# Patient Record
Sex: FEMALE | Race: WHITE | NOT HISPANIC OR LATINO | Employment: OTHER | RURAL
[De-identification: names, ages, dates, MRNs, and addresses within clinical notes are randomized per-mention and may not be internally consistent; named-entity substitution may affect disease eponyms.]

---

## 2017-05-23 ENCOUNTER — CONVERSION ENCOUNTER (OUTPATIENT)
Dept: FAMILY MEDICINE CLINIC | Facility: CLINIC | Age: 82
End: 2017-05-23

## 2017-05-23 LAB
ALBUMIN SERPL-MCNC: 4.2 G/DL (ref 3.6–5.1)
ALP SERPL-CCNC: 55 U/L (ref 33–130)
AST SERPL-CCNC: 21 U/L (ref 10–35)
BILIRUB SERPL-MCNC: 0.7 MG/DL (ref 0.2–1.2)
BUN SERPL-MCNC: 8 MG/DL (ref 7–25)
BUN/CREAT SERPL: 13.3 (CALC) (ref 6–22)
CALCIUM SERPL-MCNC: 9.7 MG/DL (ref 8.6–10.2)
CHLORIDE SERPL-SCNC: 104 MMOL/L (ref 98–110)
CHOLEST SERPL-MCNC: 241 MG/DL (ref 125–200)
CONV CO2: 25 MMOL/L (ref 21–33)
CONV TOTAL PROTEIN: 6 G/DL (ref 6.2–8.3)
CREAT UR-MCNC: 0.6 MG/DL (ref 0.63–1.22)
GLOBULIN UR ELPH-MCNC: 1.8 MG/DL (ref 2.2–3.9)
GLUCOSE UR QL: 117 MG/DL (ref 65–99)
HDLC SERPL-MCNC: 62 MG/DL
INSULIN SERPL-ACNC: 2.3 (CALC) (ref 1–2.1)
LDLC SERPL CALC-MCNC: 122 MG/DL
POTASSIUM SERPL-SCNC: 4.1 MMOL/L (ref 3.5–5.3)
SODIUM SERPL-SCNC: 139 MMOL/L (ref 135–146)
TRIGL SERPL-MCNC: 287 MG/DL

## 2017-06-09 ENCOUNTER — HOSPITAL ENCOUNTER (OUTPATIENT)
Dept: OTHER | Facility: HOSPITAL | Age: 82
Discharge: HOME | End: 2017-06-09
Attending: UROLOGY | Admitting: UROLOGY

## 2017-08-07 ENCOUNTER — HOSPITAL ENCOUNTER (OUTPATIENT)
Dept: OTHER | Facility: HOSPITAL | Age: 82
Discharge: HOME | End: 2017-08-07
Attending: FAMILY MEDICINE | Admitting: FAMILY MEDICINE

## 2018-07-17 ENCOUNTER — EPISODE CHANGES (OUTPATIENT)
Dept: CASE MANAGEMENT | Facility: OTHER | Age: 83
End: 2018-07-17

## 2018-07-23 ENCOUNTER — EPISODE CHANGES (OUTPATIENT)
Dept: CASE MANAGEMENT | Facility: OTHER | Age: 83
End: 2018-07-23

## 2018-07-23 ENCOUNTER — PATIENT OUTREACH (OUTPATIENT)
Dept: CASE MANAGEMENT | Facility: OTHER | Age: 83
End: 2018-07-23

## 2018-07-23 NOTE — OUTREACH NOTE
RN-Care Advisor contacted [facility] at [phone].  Patient was recently discharged from [facility].  Staff indicated patient is Long Term Care status.  RN Care Advisor will continue to monitor for future acute ED/Admissions.

## 2018-09-25 ENCOUNTER — CONVERSION ENCOUNTER (OUTPATIENT)
Dept: FAMILY MEDICINE CLINIC | Facility: CLINIC | Age: 83
End: 2018-09-25

## 2018-11-26 ENCOUNTER — PATIENT OUTREACH (OUTPATIENT)
Dept: CASE MANAGEMENT | Facility: OTHER | Age: 83
End: 2018-11-26

## 2018-11-26 NOTE — OUTREACH NOTE
Documenting additional chart review.  Patient had ED visit 11/24/2018 at [facility].  Notes indicate patient was treated and transported back to LTC facility.  No outreach planned at this time.

## 2019-01-21 ENCOUNTER — CONVERSION ENCOUNTER (OUTPATIENT)
Dept: FAMILY MEDICINE CLINIC | Facility: CLINIC | Age: 84
End: 2019-01-21

## 2019-03-12 ENCOUNTER — CONVERSION ENCOUNTER (OUTPATIENT)
Dept: FAMILY MEDICINE CLINIC | Facility: CLINIC | Age: 84
End: 2019-03-12

## 2019-06-12 VITALS
BODY MASS INDEX: 24.18 KG/M2 | SYSTOLIC BLOOD PRESSURE: 130 MMHG | HEART RATE: 86 BPM | RESPIRATION RATE: 17 BRPM | RESPIRATION RATE: 20 BRPM | SYSTOLIC BLOOD PRESSURE: 130 MMHG | WEIGHT: 128.06 LBS | HEART RATE: 75 BPM | BODY MASS INDEX: 25.92 KG/M2 | HEART RATE: 81 BPM | BODY MASS INDEX: 25.37 KG/M2 | RESPIRATION RATE: 18 BRPM | OXYGEN SATURATION: 98 % | OXYGEN SATURATION: 96 % | HEIGHT: 61 IN | DIASTOLIC BLOOD PRESSURE: 78 MMHG | HEIGHT: 61 IN | WEIGHT: 137.3 LBS | WEIGHT: 134.4 LBS | DIASTOLIC BLOOD PRESSURE: 70 MMHG | HEIGHT: 61 IN | DIASTOLIC BLOOD PRESSURE: 80 MMHG | OXYGEN SATURATION: 96 % | SYSTOLIC BLOOD PRESSURE: 133 MMHG

## 2019-07-10 ENCOUNTER — DOCUMENTATION (OUTPATIENT)
Dept: FAMILY MEDICINE CLINIC | Facility: CLINIC | Age: 84
End: 2019-07-10

## 2019-07-11 DIAGNOSIS — R11.2 NON-INTRACTABLE VOMITING WITH NAUSEA, UNSPECIFIED VOMITING TYPE: Primary | ICD-10-CM

## 2019-07-11 RX ORDER — PROMETHAZINE HYDROCHLORIDE 25 MG/1
25 TABLET ORAL EVERY 6 HOURS PRN
Qty: 20 TABLET | Refills: 0 | Status: SHIPPED | OUTPATIENT
Start: 2019-07-11 | End: 2019-07-16

## 2019-07-15 DIAGNOSIS — H92.03 OTALGIA OF BOTH EARS: Primary | ICD-10-CM

## 2019-07-16 ENCOUNTER — TELEPHONE (OUTPATIENT)
Dept: FAMILY MEDICINE CLINIC | Facility: CLINIC | Age: 84
End: 2019-07-16

## 2019-07-17 ENCOUNTER — TELEPHONE (OUTPATIENT)
Dept: FAMILY MEDICINE CLINIC | Facility: CLINIC | Age: 84
End: 2019-07-17

## 2019-08-09 DIAGNOSIS — G89.29 OTHER CHRONIC PAIN: Primary | ICD-10-CM

## 2019-08-09 RX ORDER — TRAMADOL HYDROCHLORIDE 50 MG/1
50 TABLET ORAL NIGHTLY
Qty: 60 TABLET | Refills: 2 | Status: SHIPPED | OUTPATIENT
Start: 2019-08-09

## 2019-08-30 ENCOUNTER — TELEPHONE (OUTPATIENT)
Dept: FAMILY MEDICINE CLINIC | Facility: CLINIC | Age: 84
End: 2019-08-30

## 2019-08-30 ENCOUNTER — LAB REQUISITION (OUTPATIENT)
Dept: LAB | Facility: HOSPITAL | Age: 84
End: 2019-08-30

## 2019-08-30 DIAGNOSIS — Z00.00 ROUTINE GENERAL MEDICAL EXAMINATION AT A HEALTH CARE FACILITY: ICD-10-CM

## 2019-08-30 LAB
BASOPHILS # BLD AUTO: 0.03 10*3/MM3 (ref 0–0.2)
BASOPHILS NFR BLD AUTO: 0.2 % (ref 0–1.5)
DEPRECATED RDW RBC AUTO: 45.3 FL (ref 37–54)
EOSINOPHIL # BLD AUTO: 0.06 10*3/MM3 (ref 0–0.4)
EOSINOPHIL NFR BLD AUTO: 0.5 % (ref 0.3–6.2)
ERYTHROCYTE [DISTWIDTH] IN BLOOD BY AUTOMATED COUNT: 14.4 % (ref 12.3–15.4)
HCT VFR BLD AUTO: 40.1 % (ref 34–46.6)
HGB BLD-MCNC: 12 G/DL (ref 12–15.9)
IMM GRANULOCYTES # BLD AUTO: 0.1 10*3/MM3 (ref 0–0.05)
IMM GRANULOCYTES NFR BLD AUTO: 0.8 % (ref 0–0.5)
LYMPHOCYTES # BLD AUTO: 1.73 10*3/MM3 (ref 0.7–3.1)
LYMPHOCYTES NFR BLD AUTO: 14.2 % (ref 19.6–45.3)
MCH RBC QN AUTO: 25.5 PG (ref 26.6–33)
MCHC RBC AUTO-ENTMCNC: 29.9 G/DL (ref 31.5–35.7)
MCV RBC AUTO: 85.3 FL (ref 79–97)
MONOCYTES # BLD AUTO: 1.12 10*3/MM3 (ref 0.1–0.9)
MONOCYTES NFR BLD AUTO: 9.2 % (ref 5–12)
NEUTROPHILS # BLD AUTO: 9.17 10*3/MM3 (ref 1.7–7)
NEUTROPHILS NFR BLD AUTO: 75.1 % (ref 42.7–76)
NRBC BLD AUTO-RTO: 0 /100 WBC (ref 0–0.2)
PLATELET # BLD AUTO: 246 10*3/MM3 (ref 140–450)
PMV BLD AUTO: 12.4 FL (ref 6–12)
RBC # BLD AUTO: 4.7 10*6/MM3 (ref 3.77–5.28)
WBC NRBC COR # BLD: 12.21 10*3/MM3 (ref 3.4–10.8)

## 2019-08-30 PROCEDURE — 85025 COMPLETE CBC W/AUTO DIFF WBC: CPT

## 2019-08-30 NOTE — TELEPHONE ENCOUNTER
I called and spoke to Zoila about these orders. Advised her to call me with results since it will be a Holiday weekend and we dont return until Wednesday next week.

## 2019-10-14 ENCOUNTER — CLINICAL SUPPORT NO REQUIREMENTS (OUTPATIENT)
Dept: CARDIOLOGY | Facility: CLINIC | Age: 84
End: 2019-10-14

## 2019-10-14 ENCOUNTER — OFFICE VISIT (OUTPATIENT)
Dept: CARDIOLOGY | Facility: CLINIC | Age: 84
End: 2019-10-14

## 2019-10-14 VITALS
HEART RATE: 109 BPM | SYSTOLIC BLOOD PRESSURE: 109 MMHG | WEIGHT: 130 LBS | OXYGEN SATURATION: 94 % | BODY MASS INDEX: 22.2 KG/M2 | DIASTOLIC BLOOD PRESSURE: 73 MMHG | HEIGHT: 64 IN

## 2019-10-14 DIAGNOSIS — Z95.0 PACEMAKER: Primary | ICD-10-CM

## 2019-10-14 DIAGNOSIS — I44.2 AV BLOCK, COMPLETE (HCC): ICD-10-CM

## 2019-10-14 DIAGNOSIS — E78.2 MIXED HYPERLIPIDEMIA: ICD-10-CM

## 2019-10-14 DIAGNOSIS — I10 BENIGN ESSENTIAL HTN: ICD-10-CM

## 2019-10-14 DIAGNOSIS — I48.0 PAROXYSMAL ATRIAL FIBRILLATION (HCC): ICD-10-CM

## 2019-10-14 PROBLEM — J06.9 ACUTE UPPER RESPIRATORY INFECTION: Status: ACTIVE | Noted: 2019-10-14

## 2019-10-14 PROBLEM — C83.50: Status: ACTIVE | Noted: 2019-10-14

## 2019-10-14 PROBLEM — Z13.220 SCREENING FOR HYPERCHOLESTEROLEMIA: Status: ACTIVE | Noted: 2019-10-14

## 2019-10-14 PROBLEM — R11.0 NAUSEA: Status: ACTIVE | Noted: 2019-10-14

## 2019-10-14 PROBLEM — E11.69 DIABETES MELLITUS TYPE 2 IN OBESE (HCC): Status: ACTIVE | Noted: 2019-10-14

## 2019-10-14 PROBLEM — R53.83 FATIGUE: Status: ACTIVE | Noted: 2019-10-14

## 2019-10-14 PROBLEM — F32.A DEPRESSION: Status: ACTIVE | Noted: 2019-10-14

## 2019-10-14 PROBLEM — Z12.79: Status: ACTIVE | Noted: 2019-10-14

## 2019-10-14 PROBLEM — R19.7 DIARRHEA: Status: ACTIVE | Noted: 2019-10-14

## 2019-10-14 PROBLEM — J44.9 CHRONIC OBSTRUCTIVE PULMONARY DISEASE (HCC): Status: ACTIVE | Noted: 2019-10-14

## 2019-10-14 PROBLEM — R91.8 LUNG FIELD ABNORMAL: Status: ACTIVE | Noted: 2019-10-14

## 2019-10-14 PROBLEM — J01.00 ACUTE NON-RECURRENT MAXILLARY SINUSITIS: Status: ACTIVE | Noted: 2019-10-14

## 2019-10-14 PROBLEM — G25.0 BENIGN ESSENTIAL TREMOR: Status: ACTIVE | Noted: 2019-10-14

## 2019-10-14 PROBLEM — M15.9 GENERALIZED OSTEOARTHRITIS: Status: ACTIVE | Noted: 2019-10-14

## 2019-10-14 PROBLEM — K80.20 CHOLELITHIASIS: Status: ACTIVE | Noted: 2019-10-14

## 2019-10-14 PROBLEM — F41.9 ANXIETY DISORDER: Status: ACTIVE | Noted: 2019-10-14

## 2019-10-14 PROBLEM — N30.80 BACTERIAL CYSTITIS: Status: ACTIVE | Noted: 2019-10-14

## 2019-10-14 PROBLEM — K64.4 EXTERNAL HEMORRHOIDS: Status: ACTIVE | Noted: 2019-10-14

## 2019-10-14 PROBLEM — Z66 DNR (DO NOT RESUSCITATE): Status: ACTIVE | Noted: 2019-10-14

## 2019-10-14 PROBLEM — E78.5 HYPERLIPIDEMIA: Status: ACTIVE | Noted: 2019-10-14

## 2019-10-14 PROBLEM — K59.09 CONSTIPATION, CHRONIC: Status: ACTIVE | Noted: 2019-10-14

## 2019-10-14 PROBLEM — N64.4 BREAST PAIN IN FEMALE: Status: ACTIVE | Noted: 2019-10-14

## 2019-10-14 PROBLEM — H69.80 DYSFUNCTION OF EUSTACHIAN TUBE: Status: ACTIVE | Noted: 2019-10-14

## 2019-10-14 PROBLEM — H69.90 DYSFUNCTION OF EUSTACHIAN TUBE: Status: ACTIVE | Noted: 2019-10-14

## 2019-10-14 PROBLEM — R31.9 HEMATURIA: Status: ACTIVE | Noted: 2019-10-14

## 2019-10-14 PROBLEM — Z79.84 LONG TERM (CURRENT) USE OF ORAL HYPOGLYCEMIC DRUGS: Status: ACTIVE | Noted: 2019-10-14

## 2019-10-14 PROBLEM — E66.9 DIABETES MELLITUS TYPE 2 IN OBESE (HCC): Status: ACTIVE | Noted: 2019-10-14

## 2019-10-14 PROBLEM — Z78.9 IMPAIRED ACTIVITIES OF DAILY LIVING: Status: ACTIVE | Noted: 2019-10-14

## 2019-10-14 PROBLEM — G99.2: Status: ACTIVE | Noted: 2019-10-14

## 2019-10-14 PROBLEM — H91.90 HEARING DIFFICULTY: Status: ACTIVE | Noted: 2019-10-14

## 2019-10-14 PROBLEM — R10.84 GENERALIZED ABDOMINAL DISCOMFORT: Status: ACTIVE | Noted: 2019-10-14

## 2019-10-14 PROBLEM — D50.9 IRON DEFICIENCY ANEMIA: Status: ACTIVE | Noted: 2019-10-14

## 2019-10-14 PROBLEM — M25.569 KNEE PAIN: Status: ACTIVE | Noted: 2019-10-14

## 2019-10-14 PROBLEM — R25.2 CRAMP OF BOTH LOWER EXTREMITIES: Status: ACTIVE | Noted: 2019-10-14

## 2019-10-14 PROBLEM — R53.1 WEAKNESS: Status: ACTIVE | Noted: 2019-10-14

## 2019-10-14 PROBLEM — R35.0 INCREASED FREQUENCY OF URINATION: Status: ACTIVE | Noted: 2019-10-14

## 2019-10-14 PROBLEM — J20.9 ACUTE BRONCHITIS: Status: ACTIVE | Noted: 2019-10-14

## 2019-10-14 PROBLEM — K92.1 BLOODY STOOL: Status: ACTIVE | Noted: 2019-10-14

## 2019-10-14 PROBLEM — R26.9 ABNORMAL GAIT: Status: ACTIVE | Noted: 2019-10-14

## 2019-10-14 PROBLEM — R05.9 COUGH: Status: ACTIVE | Noted: 2019-10-14

## 2019-10-14 PROBLEM — Z23 NEED FOR IMMUNIZATION AGAINST INFLUENZA: Status: ACTIVE | Noted: 2019-10-14

## 2019-10-14 PROBLEM — H65.03 BILATERAL ACUTE SEROUS OTITIS MEDIA: Status: ACTIVE | Noted: 2019-10-14

## 2019-10-14 PROBLEM — E13.40: Status: ACTIVE | Noted: 2019-10-14

## 2019-10-14 PROBLEM — Z87.891 HISTORY OF TOBACCO USE: Status: ACTIVE | Noted: 2019-10-14

## 2019-10-14 PROBLEM — R60.0 EDEMA OF LEFT LOWER EXTREMITY: Status: ACTIVE | Noted: 2019-10-14

## 2019-10-14 PROBLEM — Z82.3 FAMILY HISTORY OF CEREBROVASCULAR ACCIDENT (CVA): Status: ACTIVE | Noted: 2019-10-14

## 2019-10-14 PROBLEM — H81.13 BENIGN POSITIONAL VERTIGO, BILATERAL: Status: ACTIVE | Noted: 2019-10-14

## 2019-10-14 PROBLEM — Z83.3 FAMILY HISTORY OF DIABETES MELLITUS: Status: ACTIVE | Noted: 2019-10-14

## 2019-10-14 PROBLEM — Z86.711 HISTORY OF PULMONARY EMBOLISM: Status: ACTIVE | Noted: 2019-10-14

## 2019-10-14 PROBLEM — Z91.81 AT HIGH RISK FOR FALLS: Status: ACTIVE | Noted: 2019-10-14

## 2019-10-14 PROBLEM — Z13.820 SCREENING FOR OSTEOPOROSIS: Status: ACTIVE | Noted: 2019-10-14

## 2019-10-14 PROBLEM — M94.0 COSTALCHONDRITIS: Status: ACTIVE | Noted: 2019-10-14

## 2019-10-14 PROBLEM — Z12.31 ENCOUNTER FOR SCREENING MAMMOGRAM FOR MALIGNANT NEOPLASM OF BREAST: Status: ACTIVE | Noted: 2019-10-14

## 2019-10-14 PROBLEM — K21.9 GASTROESOPHAGEAL REFLUX DISEASE: Status: ACTIVE | Noted: 2019-10-14

## 2019-10-14 PROBLEM — E03.9 HYPOTHYROIDISM: Status: ACTIVE | Noted: 2019-10-14

## 2019-10-14 PROBLEM — R14.0 ABDOMINAL DISTENTION: Status: ACTIVE | Noted: 2019-10-14

## 2019-10-14 PROBLEM — E78.1 HYPERTRIGLYCERIDEMIA, ESSENTIAL: Status: ACTIVE | Noted: 2019-10-14

## 2019-10-14 PROBLEM — H35.9 MACULAR DISORDER: Status: ACTIVE | Noted: 2019-10-14

## 2019-10-14 PROBLEM — R42 DIZZINESS: Status: ACTIVE | Noted: 2019-10-14

## 2019-10-14 PROBLEM — N20.0 CALCULUS OF KIDNEY: Status: ACTIVE | Noted: 2019-10-14

## 2019-10-14 PROBLEM — H40.9 GLAUCOMA: Status: ACTIVE | Noted: 2019-10-14

## 2019-10-14 PROBLEM — L02.229 BOIL OF TRUNK: Status: ACTIVE | Noted: 2019-10-14

## 2019-10-14 PROCEDURE — 99214 OFFICE O/P EST MOD 30 MIN: CPT | Performed by: INTERNAL MEDICINE

## 2019-10-14 PROCEDURE — 93000 ELECTROCARDIOGRAM COMPLETE: CPT | Performed by: INTERNAL MEDICINE

## 2019-10-14 PROCEDURE — 93280 PM DEVICE PROGR EVAL DUAL: CPT | Performed by: INTERNAL MEDICINE

## 2019-10-14 NOTE — PROGRESS NOTES
Encounter Date:10/14/2019  Last seen 3/11/2019      Patient ID: Ashley Elkins is a 86 y.o. female.    Chief Complaint:  Follow-up  Pacemaker  History of atrial fibrillation  Dyslipidemia  Diabetes    History of Present Illness    Since I have last seen, the patient has been without any chest discomfort ,shortness of breath, palpitations, dizziness or syncope.  Denies having any headache ,abdominal pain ,nausea, vomiting , diarrhea constipation, loss of weight or loss of appetite.  Denies having any excessive bruising ,hematuria or blood in the stool.    Review of all systems negative except as indicated  Assessment and Plan       ////////////////////////////    Impression  ==================  -status post permanent dual-chamber pacemaker implantation (Medtronic MRI compatible) and removal of loop recorder 10/14/2016  - history of dizziness and near-syncope.  improved since pacemaker implantation     - history of atrial fibrillation -maintaining sinus rhythm.    -chest pain-improved  Negative Lexiscan Cardiolite test July 2016.      - diabetes dyslipidemia parkinsonism and non-Hodgkin's lymphoma    - history of normal coronary arteries and hypercontractile left ventricle 2012.    - status post cholecystectomy appendectomy and hysterectomy    - allergy to penicillin and sulfa     - DNR  =====  =======  EKG showed atrial sensed ventricular paced rhythm  Patient is not having any angina pectoris or congestive heart failure or recurrence of syncope.  Medications were reviewed and updated.  Interrogation of the pacemaker revealed excellent pacing parameters.  Followup in the office in 6 months With pacemaker interrogation.  //////////I           Diagnosis Plan   1. Pacemaker  ECG 12 Lead   2. Paroxysmal atrial fibrillation (CMS/HCC)  ECG 12 Lead   3. AV block, complete (CMS/HCC)  ECG 12 Lead   4. Benign essential HTN  ECG 12 Lead   5. Mixed hyperlipidemia  ECG 12 Lead   LAB RESULTS (LAST 7 DAYS)    CBC        BMP         CMP         BNP        TROPONIN        CoAg        Creatinine Clearance  CrCl cannot be calculated (Patient's most recent lab result is older than the maximum 30 days allowed.).    ABG        Radiology  No radiology results for the last day                The following portions of the patient's history were reviewed and updated as appropriate: allergies, current medications, past family history, past medical history, past social history, past surgical history and problem list.    Review of Systems   Constitution: Negative for malaise/fatigue.   Cardiovascular: Negative for chest pain, leg swelling, palpitations and syncope.   Respiratory: Negative for shortness of breath.    Skin: Negative for rash.   Gastrointestinal: Negative for nausea and vomiting.   Neurological: Negative for dizziness, light-headedness and numbness.         Current Outpatient Medications:   •  acetaminophen (ACETAMINOPHEN 8 HOUR) 650 MG 8 hr tablet, Take  by mouth Every 8 (Eight) Hours., Disp: , Rfl:   •  BIOTIN PO, BIOTIN, Disp: , Rfl:   •  docusate sodium (COLACE) 100 MG capsule, Take 1 capsule by mouth., Disp: , Rfl:   •  gabapentin (NEURONTIN) 300 MG capsule, GABAPENTIN 300 MG CAPS, Disp: , Rfl:   •  magnesium oxide 250 MG tablet, Take  by mouth Daily., Disp: , Rfl:   •  metFORMIN (GLUCOPHAGE) 1000 MG tablet, Take  by mouth., Disp: , Rfl:   •  montelukast (SINGULAIR) 10 MG tablet, Daily., Disp: , Rfl:   •  Multiple Vitamins-Minerals (OCUVITE PO), Take  by mouth., Disp: , Rfl:   •  pantoprazole (PROTONIX) 40 MG EC tablet, Daily., Disp: , Rfl:   •  Probiotic Product (PROBIOTIC DAILY PO), Daily., Disp: , Rfl:   •  promethazine (PHENERGAN) 25 MG tablet, Daily., Disp: , Rfl:   •  sulindac (CLINORIL) 150 MG tablet, Take  by mouth., Disp: , Rfl:   •  timolol (TIMOPTIC-XR) 0.5 % ophthalmic gel-forming, Apply  to eye(s) as directed by provider Daily., Disp: , Rfl:   •  traMADol (ULTRAM) 50 MG tablet, Take 1 tablet by mouth Every Night., Disp:  60 tablet, Rfl: 2  •  vitamin B-12 (CYANOCOBALAMIN) 1000 MCG tablet, VITAMIN B-12 1000 MCG TABS, Disp: , Rfl:     Allergies   Allergen Reactions   • Penicillins Unknown (See Comments)   • Sulfa Antibiotics Unknown (See Comments)       Family History   Problem Relation Age of Onset   • Heart disease Other    • Stroke Mother    • Diabetes Mother        Past Surgical History:   Procedure Laterality Date   • CARDIAC PACEMAKER PLACEMENT  10/14/2016   • TOTAL ABDOMINAL HYSTERECTOMY  1978    (Ovaries remain)       Past Medical History:   Diagnosis Date   • Abdominal discomfort, generalized     Impression: May be the metformin. Have her stop this. Will check labs   • Abdominal distension     Impression: This may be due to a GI infection. She had a KUB ordered and read by me. The KUB was normal.   • Abnormal gait     Impression: Deconditioning, needs physical therapy   • Acute cystitis with hematuria     Impression: resolved   • Acute non-recurrent maxillary sinusitis     Impression: She was prescribed Cipro to treat her symptoms. She was given Depo/Dexa IM inj. Increase fluids. Tylenol/motrin for pain or fever. Medication and medication adverse effects discussed. Follow-up 5-7 days for reevaluation if not improved or sooner if needed. The insulin will make her bs increase and she will need to monitor her sugar.   • Anxiety     Impression: stable. No medications at this time.   • At high risk for falls     Impression: due to Parkinson's related weakness.   • Bacterial cystitis     Impression: ESBL with only 50,000 colonies and mild sx, not tested for Fosfomycin by due to the mild infection I feel it is better than more high risk drugs   • Benign essential HTN     Impression: Improved from her previous visit. stable on recheck. Her blood pressure can not support and ace or arb   • Benign essential tremor     Impression: stable, only worsens with eating and drinking.   • Benign positional vertigo, bilateral     Impression:  Based on her symptoms she likely has BPV. She was given a handout on the Epley manuever. She was encouraged to RTC if her symptoms did not improve.   • Bilateral acute serous otitis media     Impression: Improving. Advised patient to continue with claritin and add Flonase. If not improving, return to be re-evaluated.   • Bloody stool     Impression: CBC was ordered. Hemoccult was ordered. Monitor her symptoms.   • Boil of trunk     Impression: no fluctuance, discussed home care, return in a few days if it worsens   • Breast pain in female     Impression: will get diagnostic mammograms   • Bronchitis, acute     Impression: Increase fluids. Tylenol/motrin for pain or fever. Medication and medication adverse effects discussed. Follow-up 5-7 days for reevaluation if not improved or sooner if needed.   • Cholelithiasis     Impression: s/p gall bladder   • Colon cancer screening    • Constipation, chronic     Impression: start Linzess 72mcg daily. Continue Colace. Hold Miralx and Milk of mag for diarrhea.   • Costochondritis     Impression: ice, nsaids, Diagnosis, treatment and course discussed. Discussed medication, dosing and adverse effects. Return if there is worsening or persistance of symptoms Cause of chest pain   • Cough    • Cramp of both lower extremities     Impression: labs ordered   • Depression     Impression: Stable on no medications. Monitor symptoms.   • Diabetes mellitus type 2 in obese (CMS/HCC)     Impression: Worsening She was advised to only take Metformin 1 tab BID. Glyburide was increased to BID. Continue to monitor sugars Goals developed at last visit were met Follow up in 4 months Care management needs are self addressed. Self-Management abilities addressed and patient is capable of managing his/her own disease   • Diabetic myelopathy due to secondary diabetes mellitus (CMS/HCC)    • Diarrhea     Impression: This is likely a GI infection and will resolve on its won. Consider a stool sample if  does not improve.   • Diffuse lymphoblastic non-Hodgkin's lymphoma (CMS/HCC)     Impression: Stable. She has seen oncology   • Dizziness     Impression: resolved, much better   • DNR (do not resuscitate)     Recorded 04/24/2019 01:09 AM by Kenton Owens MD, [facility].   • Dysuria     Impression: send urine for culture prescription as below. She is going to see urology   • Edema of left lower extremity     Impression: us ordered- returned negative for DVT   • Encounter for screening for malignant neoplasm of other genitourinary organs     Impression: She declines   • Encounter for screening mammogram for malignant neoplasm of breast    • Eustachian tube dysfunction    • External hemorrhoid     Impression: from diarrhea, a little sore she says.   • Fatigue     Impression: check labs as above. possible UTI. Will get culture   • Gastroesophageal reflux disease     Impression: She was seen at [facility] and stayed for cardiac work up, found to be gerd.   • Generalized osteoarthritis     Impression: left knee   • Glaucoma     Impression: worsening per patient and she is seeing opthomologist   • Hearing difficulty    • Hematuria     Impression: urine culture sent, anitbiotc sent   • History of pulmonary embolism    • History of tobacco use    • Hypertriglyceridemia, essential    • Hypothyroidism, unspecified     Impression: Stable. Refills were given.   • Iatrogenic pulmonary embolism and infarction (CMS/HCC)     Impression: mulitple PE in both lungs, 6/2011. Currenlty on no Coumodin.   • Incisional pain     Impression: rash actually, irritated only   • Iron deficiency anemia     Impression: per low MCV on cbc. Check iron levels to see how bad it is. Start iron supplement.   • Kidney stone     Impression: found in ER, she has appt with urologist, Dr Everett   • Knee pain     Impression: right knee strain, monitor. nsaids and tylenol. recheck 2 weeks if not better   • Long term (current) use of oral hypoglycemic drugs    •  Macular disorder      (Probable Diagnosis) Story: Seeing retinal specialist at [facility] 10/16/14   • Medicare annual wellness visit, subsequent     Story: She is on hospice care for parkinson's.  Impression: Discussed injury prevention, diet and exercise, safe sexual practices, and screening for common diseases. Encouraged use of sunscreen and seatbelts. Avoidance of tobacco encouraged. Limitation or avoidance of alcohol encouraged. Recommend yearly dental and eye exams. Also discussed monitoring of blood pressure, lipids.   • Mixed hyperlipidemia     Impression: Discussed the need for her to remain on her statin. It was mutually decided that her Statin would be stopped on 07/06/17.   • Nausea     Impression: She was prescribed Zofran to treat her symptoms.   • Neuropathy of both feet     Impression: Secondary to diabetes and also parkinson's. Monofilament exam failed. Diabetic shoes recommended for this patient. Order faxed.   • Other chest pain     Impression: Work up negative in hospital. It was chest wall pain. She has a prescription for norco that Dr Moncada gave her.   • Overweight    • Parkinson disease (CMS/[facility])     Impression: Slowly worsening. Discussed disease progression. Discussed she will continue to get worse. Patient will need to be scheduled for a face to face. This will be done within the next 7 days.   • Physical deconditioning     Impression: from parkinsons and age. WIll set up home health   • Radiologic findings of lung field, abnormal    • Restless leg     Impression: Stable.   • Screening for hypercholesterolemia    • Screening for osteoporosis    • Senile osteoporosis     Impression: Diagnosis, treatment and course discussed. Discussed medication, dosing and adverse effects. Return if there is worsening or persistance of symptoms   • Shortness of breath     Impression: continue oxygen therapy.   • Skin tag    • Sternoclavicular joint pain, left     Impression: New diagnosis-Advised  patient to do ROM exercsies, alternate heat/ice on neck and gentle massage. discussed more aggressive workup and treatment but pt declined   • Syncope     unspecified syncope type. Impression: possibly heart. She is seeing Dr Moncada   • Tachycardia     Impression: check holter, send to cariologist- Dr Howell   • Tremor     Impression: familial vs parkinson's. Neurologist is leaning to familial type. He is adjusting her meds. She is seeing Dr Butts   • Type II diabetes mellitus, uncontrolled (CMS/HCC)     Impression: 140   • Upper back pain     Impression: muscular, discussed ice and otc tylenol. return if owrsens or not better in 2 weeks or sooner if needed   • URI (upper respiratory infection)     Impression: Increase fluids. Tylenol/motrin for pain or fever. Medication and medication adverse effects discussed. Follow-up 5-7 days for reevaluation if not improved or sooner if needed.   • Urinary frequency     Impression: could not urinate. Increase in urination at night, secodnary to new meds, Diagnosis, treatment and course discussed. Discussed medication, dosing and adverse effects. Return if there is worsening or persistance of symptoms Return for u/a next week. Let us know if change in meds, changes symptoms.   • Urinary retention     Impression: mild. see if it resolves with resolution of constipation   • UTI due to extended-spectrum beta lactamase (ESBL) producing Escherichia coli     Impression: pt has minimal sx with no evidence of pyloneph and only 50,000 colonies thus will treat with fosfomycin   • Viral gastroenteritis     Impression: feeling much better.   • Weakness     Story: secondary to Parkinson's. Impression: Went to the ER. Work up was negative in the ER. Discussed that since she declines medication for parkinson's that her disease will progress to issues with walking, talking, eating, all ADL's. She states understanding and her daughter does as well.       Family History   Problem Relation Age  "of Onset   • Heart disease Other    • Stroke Mother    • Diabetes Mother        Social History     Socioeconomic History   • Marital status:      Spouse name: Not on file   • Number of children: Not on file   • Years of education: Not on file   • Highest education level: Not on file   Tobacco Use   • Smoking status: Former Smoker   Substance and Sexual Activity   • Alcohol use: No     Frequency: Never           ECG 12 Lead  Date/Time: 10/14/2019 3:23 PM  Performed by: Nancy Howell MD  Authorized by: Nancy Howell MD   Comparison: compared with previous ECG   Comments: Atrial sensed ventricular paced rhythm 106/min no ectopy nonspecific ST-T wave changes no change from 3/11/2019              Objective:       Physical Exam    /73 (BP Location: Left arm, Patient Position: Sitting, Cuff Size: Adult)   Pulse 109   Ht 162.6 cm (64\")   Wt 59 kg (130 lb)   SpO2 94%   BMI 22.31 kg/m²   The patient is alert, oriented and in no distress.    Vital signs as noted above.    Head and neck revealed no carotid bruits or jugular venous distension.  No thyromegaly or lymphadenopathy is present.    Lungs clear.  No wheezing.  Breath sounds are normal bilaterally.    Heart normal first and second heart sounds.  No murmur..  No pericardial rub is present.  No gallop is present.    Abdomen soft and nontender.  No organomegaly is present.    Extremities revealed good peripheral pulses without any pedal edema.    Skin warm and dry.  Pacemaker site looks normal.    Musculoskeletal system is grossly normal.    CNS grossly normal.        "

## 2020-06-08 ENCOUNTER — TELEMEDICINE - AUDIO (OUTPATIENT)
Dept: CARDIOLOGY | Facility: CLINIC | Age: 85
End: 2020-06-08

## 2020-06-08 DIAGNOSIS — E78.2 MIXED HYPERLIPIDEMIA: ICD-10-CM

## 2020-06-08 DIAGNOSIS — I10 BENIGN ESSENTIAL HTN: ICD-10-CM

## 2020-06-08 DIAGNOSIS — I48.0 PAROXYSMAL ATRIAL FIBRILLATION (HCC): ICD-10-CM

## 2020-06-08 DIAGNOSIS — Z95.0 PACEMAKER: Primary | ICD-10-CM

## 2020-06-08 DIAGNOSIS — I44.2 AV BLOCK, COMPLETE (HCC): ICD-10-CM

## 2020-06-08 PROCEDURE — 99442 PR PHYS/QHP TELEPHONE EVALUATION 11-20 MIN: CPT | Performed by: INTERNAL MEDICINE

## 2020-06-08 RX ORDER — POLYETHYLENE GLYCOL 3350 17 G/17G
POWDER, FOR SOLUTION ORAL
COMMUNITY
Start: 2020-06-01

## 2020-06-08 RX ORDER — LATANOPROST 50 UG/ML
SOLUTION/ DROPS OPHTHALMIC
COMMUNITY
Start: 2020-04-24

## 2020-06-08 RX ORDER — GUAIFENESIN 600 MG/1
600 TABLET, EXTENDED RELEASE ORAL 2 TIMES DAILY
COMMUNITY

## 2020-06-08 RX ORDER — LORATADINE 10 MG/1
TABLET ORAL
COMMUNITY
Start: 2020-06-05

## 2020-06-08 RX ORDER — ONDANSETRON 4 MG/1
TABLET, FILM COATED ORAL
COMMUNITY
Start: 2020-05-29 | End: 2022-11-15

## 2020-06-08 RX ORDER — GABAPENTIN 600 MG/1
600 TABLET ORAL DAILY
COMMUNITY
Start: 2020-06-03

## 2020-06-08 RX ORDER — MAGNESIUM HYDROXIDE 1200 MG/15ML
SUSPENSION ORAL
COMMUNITY
Start: 2020-05-29

## 2020-06-08 RX ORDER — LINACLOTIDE 72 UG/1
72 CAPSULE, GELATIN COATED ORAL
COMMUNITY
Start: 2020-05-14 | End: 2022-11-15

## 2020-06-08 RX ORDER — TIMOLOL MALEATE 5 MG/ML
SOLUTION/ DROPS OPHTHALMIC
COMMUNITY
Start: 2020-05-08

## 2020-06-08 RX ORDER — LEVOTHYROXINE SODIUM 0.03 MG/1
25 TABLET ORAL DAILY
COMMUNITY
Start: 2020-05-19

## 2020-06-08 RX ORDER — SULINDAC 200 MG/1
TABLET ORAL
COMMUNITY
Start: 2020-03-12 | End: 2022-11-15

## 2020-06-08 RX ORDER — BISACODYL 10 MG
SUPPOSITORY, RECTAL RECTAL
COMMUNITY
Start: 2020-03-13

## 2020-06-08 NOTE — PROGRESS NOTES
You have chosen to receive care through a telehealth visit.  Do you consent to use a video/audio connection for your medical care today? Yes  Telephone visit- 12 minutes    Last seen 10/14/2019    Encounter Date:06/08/2020  Last seen 10/14/2019      Patient ID: Ashley Elkins is a 87 y.o. female.    Chief Complaint:  Follow-up  Pacemaker  History of atrial fibrillation  Dyslipidemia  Diabetes     History of Present Illness     Since I have last seen, the patient has been without any chest discomfort ,shortness of breath, palpitations, dizziness or syncope.  Denies having any headache ,abdominal pain ,nausea, vomiting , diarrhea constipation, loss of weight or loss of appetite.  Denies having any excessive bruising ,hematuria or blood in the stool.     Review of all systems negative except as indicated  Assessment and Plan         ////////////////////////////    Impression  ==================  -status post permanent dual-chamber pacemaker implantation ([employer]tronic MRI compatible) and removal of loop recorder 10/14/2016  - history of dizziness and near-syncope.  improved since pacemaker implantation      - history of atrial fibrillation -maintaining sinus rhythm.     -chest pain-improved  Negative Lexiscan Cardiolite test July 2016.       - diabetes dyslipidemia parkinsonism and non-Hodgkin's lymphoma     - history of normal coronary arteries and hypercontractile left ventricle 2012.     - status post cholecystectomy appendectomy and hysterectomy     - allergy to penicillin and sulfa      - DNR  =====  =======  Video converted to telephone visit-technical difficulties  Patient is not having any angina pectoris or congestive heart failure or recurrence of syncope.  Medications were reviewed and updated.  Follow-up in the office in 3 months with pacemaker interrogation  Further plan will depend on patient's progress.  //////////I         Diagnosis Plan   1. Pacemaker     2. Paroxysmal atrial fibrillation (CMS/HCC)     3.  AV block, complete (CMS/HCC)     4. Benign essential HTN     5. Mixed hyperlipidemia     LAB RESULTS (LAST 7 DAYS)    CBC        BMP        CMP         BNP        TROPONIN        CoAg        Creatinine Clearance  CrCl cannot be calculated (Patient's most recent lab result is older than the maximum 30 days allowed.).    ABG        Radiology  No radiology results for the last day                The following portions of the patient's history were reviewed and updated as appropriate: allergies, current medications, past family history, past medical history, past social history, past surgical history and problem list.    Review of Systems   Constitution: Negative for fever and malaise/fatigue.   HENT: Negative for congestion and hearing loss.    Eyes: Negative for double vision and visual disturbance.   Cardiovascular: Negative for chest pain, claudication, dyspnea on exertion, leg swelling, palpitations and syncope.   Respiratory: Negative for cough and shortness of breath.    Endocrine: Negative for cold intolerance.   Skin: Negative for color change and rash.   Musculoskeletal: Negative for arthritis and joint pain.   Gastrointestinal: Negative for abdominal pain and heartburn.   Genitourinary: Negative for hematuria.   Neurological: Negative for excessive daytime sleepiness and dizziness.   Psychiatric/Behavioral: Negative for depression. The patient is not nervous/anxious.    All other systems reviewed and are negative.        Current Outpatient Medications:   •  acetaminophen (ACETAMINOPHEN 8 HOUR) 650 MG 8 hr tablet, Take  by mouth Every 8 (Eight) Hours., Disp: , Rfl:   •  ALLERGY RELIEF 10 MG tablet, , Disp: , Rfl:   •  BIOTIN PO, BIOTIN, Disp: , Rfl:   •  docusate sodium (COLACE) 100 MG capsule, Take 1 capsule by mouth., Disp: , Rfl:   •  gabapentin (NEURONTIN) 600 MG tablet, Take 600 mg by mouth Daily., Disp: , Rfl:   •  guaiFENesin (MUCINEX) 600 MG 12 hr tablet, Take 600 mg by mouth 2 (Two) Times a Day.,  Disp: , Rfl:   •  latanoprost (XALATAN) 0.005 % ophthalmic solution, , Disp: , Rfl:   •  levothyroxine (SYNTHROID, LEVOTHROID) 25 MCG tablet, Take 25 mcg by mouth Daily., Disp: , Rfl:   •  LINZESS 72 MCG capsule capsule, 72 mcg Every Morning Before Breakfast., Disp: , Rfl:   •  metFORMIN (GLUCOPHAGE) 1000 MG tablet, Take 1,000 mg by mouth Daily With Breakfast., Disp: , Rfl:   •  MILK OF MAGNESIA 7.75 % suspension, , Disp: , Rfl:   •  montelukast (SINGULAIR) 10 MG tablet, Daily., Disp: , Rfl:   •  Multiple Vitamins-Minerals (OCUVITE PO), Take  by mouth., Disp: , Rfl:   •  ondansetron (ZOFRAN) 4 MG tablet, , Disp: , Rfl:   •  pantoprazole (PROTONIX) 40 MG EC tablet, Daily., Disp: , Rfl:   •  polyethylene glycol (MIRALAX) 17 GM/SCOOP powder, , Disp: , Rfl:   •  sulindac (CLINORIL) 200 MG tablet, , Disp: , Rfl:   •  timolol (TIMOPTIC-XR) 0.5 % ophthalmic gel-forming, Apply  to eye(s) as directed by provider Daily., Disp: , Rfl:   •  traMADol (ULTRAM) 50 MG tablet, Take 1 tablet by mouth Every Night., Disp: 60 tablet, Rfl: 2  •  vitamin B-12 (CYANOCOBALAMIN) 1000 MCG tablet, VITAMIN B-12 1000 MCG TABS, Disp: , Rfl:   •  bisacodyl (DULCOLAX) 10 MG suppository, , Disp: , Rfl:   •  gabapentin (NEURONTIN) 300 MG capsule, GABAPENTIN 300 MG CAPS, Disp: , Rfl:   •  magnesium oxide 250 MG tablet, Take  by mouth Daily., Disp: , Rfl:   •  Probiotic Product (PROBIOTIC DAILY PO), Daily., Disp: , Rfl:   •  promethazine (PHENERGAN) 25 MG tablet, Daily., Disp: , Rfl:   •  raNITIdine HCl (RANITIDINE 150 MAX STRENGTH PO), 1 tablet Daily., Disp: , Rfl:   •  timolol (TIMOPTIC) 0.5 % ophthalmic solution, , Disp: , Rfl:     Allergies   Allergen Reactions   • Penicillins Unknown (See Comments)   • Sulfa Antibiotics Unknown (See Comments)       Family History   Problem Relation Age of Onset   • Heart disease Other    • Stroke Mother    • Diabetes Mother        Past Surgical History:   Procedure Laterality Date   • CARDIAC PACEMAKER PLACEMENT   10/14/2016   • TOTAL ABDOMINAL HYSTERECTOMY  1978    (Ovaries remain)       Past Medical History:   Diagnosis Date   • Abdominal discomfort, generalized     Impression: May be the metformin. Have her stop this. Will check labs   • Abdominal distension     Impression: This may be due to a GI infection. She had a KUB ordered and read by me. The KUB was normal.   • Abnormal gait     Impression: Deconditioning, needs physical therapy   • Acute cystitis with hematuria     Impression: resolved   • Acute non-recurrent maxillary sinusitis     Impression: She was prescribed Cipro to treat her symptoms. She was given Depo/Dexa IM inj. Increase fluids. Tylenol/motrin for pain or fever. Medication and medication adverse effects discussed. Follow-up 5-7 days for reevaluation if not improved or sooner if needed. The insulin will make her bs increase and she will need to monitor her sugar.   • Anxiety     Impression: stable. No medications at this time.   • At high risk for falls     Impression: due to Parkinson's related weakness.   • Bacterial cystitis     Impression: ESBL with only 50,000 colonies and mild sx, not tested for Fosfomycin by due to the mild infection I feel it is better than more high risk drugs   • Benign essential HTN     Impression: Improved from her previous visit. stable on recheck. Her blood pressure can not support and ace or arb   • Benign essential tremor     Impression: stable, only worsens with eating and drinking.   • Benign positional vertigo, bilateral     Impression: Based on her symptoms she likely has BPV. She was given a handout on the Epley manuever. She was encouraged to RTC if her symptoms did not improve.   • Bilateral acute serous otitis media     Impression: Improving. Advised patient to continue with claritin and add Flonase. If not improving, return to be re-evaluated.   • Bloody stool     Impression: CBC was ordered. Hemoccult was ordered. Monitor her symptoms.   • Boil of trunk      Impression: no fluctuance, discussed home care, return in a few days if it worsens   • Breast pain in female     Impression: will get diagnostic mammograms   • Bronchitis, acute     Impression: Increase fluids. Tylenol/motrin for pain or fever. Medication and medication adverse effects discussed. Follow-up 5-7 days for reevaluation if not improved or sooner if needed.   • Cholelithiasis     Impression: s/p gall bladder   • Colon cancer screening    • Constipation, chronic     Impression: start Linzess 72mcg daily. Continue Colace. Hold Miralx and Milk of mag for diarrhea.   • Costochondritis     Impression: ice, nsaids, Diagnosis, treatment and course discussed. Discussed medication, dosing and adverse effects. Return if there is worsening or persistance of symptoms Cause of chest pain   • Cough    • COVID-19    • Cramp of both lower extremities     Impression: labs ordered   • Depression     Impression: Stable on no medications. Monitor symptoms.   • Diabetes mellitus type 2 in obese (CMS/HCC)     Impression: Worsening She was advised to only take Metformin 1 tab BID. Glyburide was increased to BID. Continue to monitor sugars Goals developed at last visit were met Follow up in 4 months Care management needs are self addressed. Self-Management abilities addressed and patient is capable of managing his/her own disease   • Diabetic myelopathy due to secondary diabetes mellitus (CMS/HCC)    • Diarrhea     Impression: This is likely a GI infection and will resolve on its won. Consider a stool sample if does not improve.   • Diffuse lymphoblastic non-Hodgkin's lymphoma (CMS/HCC)     Impression: Stable. She has seen oncology   • Dizziness     Impression: resolved, much better   • DNR (do not resuscitate)     Recorded 04/24/2019 01:09 AM by Kenton Owens MD, [facility].   • Dysuria     Impression: send urine for culture prescription as below. She is going to see urology   • Edema of left lower extremity     Impression: us  ordered- returned negative for DVT   • Encounter for screening for malignant neoplasm of other genitourinary organs     Impression: She declines   • Encounter for screening mammogram for malignant neoplasm of breast    • Eustachian tube dysfunction    • External hemorrhoid     Impression: from diarrhea, a little sore she says.   • Fatigue     Impression: check labs as above. possible UTI. Will get culture   • Gastroesophageal reflux disease     Impression: She was seen at [facility] and stayed for cardiac work up, found to be gerd.   • Generalized osteoarthritis     Impression: left knee   • Glaucoma     Impression: worsening per patient and she is seeing opthomologist   • Hearing difficulty    • Hematuria     Impression: urine culture sent, anitbiotc sent   • History of pulmonary embolism    • History of tobacco use    • Hypertriglyceridemia, essential    • Hypothyroidism, unspecified     Impression: Stable. Refills were given.   • Iatrogenic pulmonary embolism and infarction (CMS/HCC)     Impression: mulitple PE in both lungs, 6/2011. Currenlty on no Coumodin.   • Incisional pain     Impression: rash actually, irritated only   • Iron deficiency anemia     Impression: per low MCV on cbc. Check iron levels to see how bad it is. Start iron supplement.   • Kidney stone     Impression: found in ER, she has appt with urologist, Dr Everett   • Knee pain     Impression: right knee strain, monitor. nsaids and tylenol. recheck 2 weeks if not better   • Long term (current) use of oral hypoglycemic drugs    • Macular disorder      (Probable Diagnosis) Story: Seeing retinal specialist at [facility] 10/16/14   • Medicare annual wellness visit, subsequent     Story: She is on hospice care for parkinson's.  Impression: Discussed injury prevention, diet and exercise, safe sexual practices, and screening for common diseases. Encouraged use of sunscreen and seatbelts. Avoidance of tobacco encouraged. Limitation or avoidance of alcohol  encouraged. Recommend yearly dental and eye exams. Also discussed monitoring of blood pressure, lipids.   • Mixed hyperlipidemia     Impression: Discussed the need for her to remain on her statin. It was mutually decided that her Statin would be stopped on 07/06/17.   • Nausea     Impression: She was prescribed Zofran to treat her symptoms.   • Neuropathy of both feet     Impression: Secondary to diabetes and also parkinson's. Monofilament exam failed. Diabetic shoes recommended for this patient. Order faxed.   • Other chest pain     Impression: Work up negative in hospital. It was chest wall pain. She has a prescription for norco that Dr Moncada gave her.   • Overweight    • Parkinson disease (CMS/[facility])     Impression: Slowly worsening. Discussed disease progression. Discussed she will continue to get worse. Patient will need to be scheduled for a face to face. This will be done within the next 7 days.   • Physical deconditioning     Impression: from parkinsons and age. WIll set up home health   • Radiologic findings of lung field, abnormal    • Restless leg     Impression: Stable.   • Screening for hypercholesterolemia    • Screening for osteoporosis    • Senile osteoporosis     Impression: Diagnosis, treatment and course discussed. Discussed medication, dosing and adverse effects. Return if there is worsening or persistance of symptoms   • Shortness of breath     Impression: continue oxygen therapy.   • Skin tag    • Sternoclavicular joint pain, left     Impression: New diagnosis-Advised patient to do ROM exercsies, alternate heat/ice on neck and gentle massage. discussed more aggressive workup and treatment but pt declined   • Syncope     unspecified syncope type. Impression: possibly heart. She is seeing Dr Moncada   • Tachycardia     Impression: check holter, send to cariologist- Dr Howell   • Tremor     Impression: familial vs parkinson's. Neurologist is leaning to familial type. He is adjusting her meds. She is  seeing Dr Butts   • Type II diabetes mellitus, uncontrolled (CMS/[facility])     Impression: 140   • Upper back pain     Impression: muscular, discussed ice and otc tylenol. return if owrsens or not better in 2 weeks or sooner if needed   • URI (upper respiratory infection)     Impression: Increase fluids. Tylenol/motrin for pain or fever. Medication and medication adverse effects discussed. Follow-up 5-7 days for reevaluation if not improved or sooner if needed.   • Urinary frequency     Impression: could not urinate. Increase in urination at night, secodnary to new meds, Diagnosis, treatment and course discussed. Discussed medication, dosing and adverse effects. Return if there is worsening or persistance of symptoms Return for u/a next week. Let us know if change in meds, changes symptoms.   • Urinary retention     Impression: mild. see if it resolves with resolution of constipation   • UTI due to extended-spectrum beta lactamase (ESBL) producing Escherichia coli     Impression: pt has minimal sx with no evidence of pyloneph and only 50,000 colonies thus will treat with fosfomycin   • Viral gastroenteritis     Impression: feeling much better.   • Weakness     Story: secondary to Parkinson's. Impression: Went to the ER. Work up was negative in the ER. Discussed that since she declines medication for parkinson's that her disease will progress to issues with walking, talking, eating, all ADL's. She states understanding and her daughter does as well.       Family History   Problem Relation Age of Onset   • Heart disease Other    • Stroke Mother    • Diabetes Mother        Social History     Socioeconomic History   • Marital status:      Spouse name: Not on file   • Number of children: Not on file   • Years of education: Not on file   • Highest education level: Not on file   Tobacco Use   • Smoking status: Former Smoker   • Smokeless tobacco: Never Used   Substance and Sexual Activity   • Alcohol use: No      Frequency: Never   • Drug use: Never   • Sexual activity: Defer         Procedures      Objective:       Physical Exam    There were no vitals taken for this visit.  The patient is alert, oriented and in no distress.    Vital signs as noted above.    No audible shortness of breath    Speech is normal      CNS grossly normal.

## 2021-10-14 ENCOUNTER — CLINICAL SUPPORT NO REQUIREMENTS (OUTPATIENT)
Dept: CARDIOLOGY | Facility: CLINIC | Age: 86
End: 2021-10-14

## 2021-10-14 ENCOUNTER — OFFICE VISIT (OUTPATIENT)
Dept: CARDIOLOGY | Facility: CLINIC | Age: 86
End: 2021-10-14

## 2021-10-14 VITALS
HEART RATE: 61 BPM | SYSTOLIC BLOOD PRESSURE: 104 MMHG | OXYGEN SATURATION: 98 % | WEIGHT: 112 LBS | BODY MASS INDEX: 19.12 KG/M2 | DIASTOLIC BLOOD PRESSURE: 64 MMHG | HEIGHT: 64 IN

## 2021-10-14 DIAGNOSIS — I44.2 AV BLOCK, COMPLETE (HCC): ICD-10-CM

## 2021-10-14 DIAGNOSIS — I10 BENIGN ESSENTIAL HTN: ICD-10-CM

## 2021-10-14 DIAGNOSIS — I48.0 PAROXYSMAL ATRIAL FIBRILLATION (HCC): ICD-10-CM

## 2021-10-14 DIAGNOSIS — Z95.0 PACEMAKER: Primary | ICD-10-CM

## 2021-10-14 DIAGNOSIS — E78.2 MIXED HYPERLIPIDEMIA: ICD-10-CM

## 2021-10-14 PROCEDURE — 93000 ELECTROCARDIOGRAM COMPLETE: CPT | Performed by: INTERNAL MEDICINE

## 2021-10-14 PROCEDURE — 99214 OFFICE O/P EST MOD 30 MIN: CPT | Performed by: INTERNAL MEDICINE

## 2021-10-14 PROCEDURE — 93280 PM DEVICE PROGR EVAL DUAL: CPT | Performed by: INTERNAL MEDICINE

## 2021-10-14 RX ORDER — CHOLECALCIFEROL (VITAMIN D3) 125 MCG
CAPSULE ORAL
COMMUNITY
Start: 2021-10-07

## 2021-10-14 RX ORDER — SITAGLIPTIN 50 MG/1
TABLET, FILM COATED ORAL
COMMUNITY
Start: 2021-09-20

## 2021-10-14 NOTE — PROGRESS NOTES
Encounter Date:10/14/2021  Last seen 6/28/2020      Patient ID: Ashley Elkins is a 88 y.o. female.    Chief Complaint:    Pacemaker  History of atrial fibrillation  Dyslipidemia  Diabetes     History of Present Illness     Since I have last seen, the patient has been without any chest discomfort ,shortness of breath, palpitations, dizziness or syncope.  Denies having any headache ,abdominal pain ,nausea, vomiting , diarrhea constipation, loss of weight or loss of appetite.  Denies having any excessive bruising ,hematuria or blood in the stool.    Review of all systems negative except as indicated.    Reviewed ROS.  Assessment and Plan         ////////////////////////////    Impression  ==================  -status post permanent dual-chamber pacemaker implantation ([employer] Powertronic MRI compatible) and removal of loop recorder 10/14/2016    - history of dizziness and near-syncope.  improved since pacemaker implantation      - history of atrial fibrillation -maintaining sinus rhythm.  Paroxysmal atrial fibrillation     -chest pain-improved  Negative Lexiscan Cardiolite test July 2016.       - diabetes dyslipidemia parkinsonism and non-Hodgkin's lymphoma     - history of normal coronary arteries and hypercontractile left ventricle 2012.     - status post cholecystectomy appendectomy and hysterectomy     - allergy to penicillin and sulfa      - DNR  =====  =======  Status post pacemaker  Pacemaker site looks normal.  Interrogation of the pacemaker revealed excellent pacing parameters.  Battery status 4.5 years.    Paroxysmal atrial fibrillation.  (Seen on interrogation of the pacemaker)    Anticoagulation status was discussed.  Patient is not on anticoagulation at this time.  Risks and benefits pros and cons of anticoagulation was discussed with patient including risk of stroke without anticoagulation and risk of bleeding and fall risk etc. with anticoagulation.  Patient was started on Eliquis 2.5 mg twice a day.    Follow-up  in office in 6 months with pacemaker interrogation.  [facility] [place].    Further plan will depend on patient's progress.    //////////I                      Diagnosis Plan   1. Pacemaker     2. AV block, complete (HCC)     3. Mixed hyperlipidemia     4. Benign essential HTN     5. Paroxysmal atrial fibrillation (HCC)     LAB RESULTS (LAST 7 DAYS)    CBC        BMP        CMP         BNP        TROPONIN        CoAg        Creatinine Clearance  CrCl cannot be calculated (Patient's most recent lab result is older than the maximum 30 days allowed.).    ABG        Radiology  No radiology results for the last day                The following portions of the patient's history were reviewed and updated as appropriate: allergies, current medications, past family history, past medical history, past social history, past surgical history and problem list.    Review of Systems   Constitutional: Negative for fever and malaise/fatigue.   HENT: Negative for ear pain and nosebleeds.    Eyes: Negative for blurred vision and double vision.   Cardiovascular: Negative for chest pain, dyspnea on exertion and palpitations.   Respiratory: Negative for cough and shortness of breath.    Skin: Negative for rash.   Musculoskeletal: Negative for joint pain.   Gastrointestinal: Negative for abdominal pain, nausea and vomiting.   Neurological: Negative for focal weakness and headaches.   Psychiatric/Behavioral: Negative for depression. The patient is not nervous/anxious.    All other systems reviewed and are negative.        Current Outpatient Medications:   •  acetaminophen (ACETAMINOPHEN 8 HOUR) 650 MG 8 hr tablet, Take  by mouth Every 8 (Eight) Hours., Disp: , Rfl:   •  ALLERGY RELIEF 10 MG tablet, , Disp: , Rfl:   •  BIOTIN PO, BIOTIN, Disp: , Rfl:   •  bisacodyl (DULCOLAX) 10 MG suppository, , Disp: , Rfl:   •  docusate sodium (COLACE) 100 MG capsule, Take 1 capsule by mouth., Disp: , Rfl:   •  gabapentin  (NEURONTIN) 300 MG capsule, GABAPENTIN 300 MG CAPS, Disp: , Rfl:   •  gabapentin (NEURONTIN) 600 MG tablet, Take 600 mg by mouth Daily., Disp: , Rfl:   •  guaiFENesin (MUCINEX) 600 MG 12 hr tablet, Take 600 mg by mouth 2 (Two) Times a Day., Disp: , Rfl:   •  latanoprost (XALATAN) 0.005 % ophthalmic solution, , Disp: , Rfl:   •  levothyroxine (SYNTHROID, LEVOTHROID) 25 MCG tablet, Take 25 mcg by mouth Daily., Disp: , Rfl:   •  LINZESS 72 MCG capsule capsule, 72 mcg Every Morning Before Breakfast., Disp: , Rfl:   •  magnesium oxide 250 MG tablet, Take  by mouth Daily., Disp: , Rfl:   •  metFORMIN (GLUCOPHAGE) 1000 MG tablet, Take 1,000 mg by mouth Daily With Breakfast., Disp: , Rfl:   •  MILK OF MAGNESIA 7.75 % suspension, , Disp: , Rfl:   •  montelukast (SINGULAIR) 10 MG tablet, Daily., Disp: , Rfl:   •  Multiple Vitamins-Minerals (OCUVITE PO), Take  by mouth., Disp: , Rfl:   •  ondansetron (ZOFRAN) 4 MG tablet, , Disp: , Rfl:   •  pantoprazole (PROTONIX) 40 MG EC tablet, Daily., Disp: , Rfl:   •  polyethylene glycol (MIRALAX) 17 GM/SCOOP powder, , Disp: , Rfl:   •  Probiotic Product (PROBIOTIC DAILY PO), Daily., Disp: , Rfl:   •  promethazine (PHENERGAN) 25 MG tablet, Daily., Disp: , Rfl:   •  raNITIdine HCl (RANITIDINE 150 MAX STRENGTH PO), 1 tablet Daily., Disp: , Rfl:   •  sulindac (CLINORIL) 200 MG tablet, , Disp: , Rfl:   •  timolol (TIMOPTIC) 0.5 % ophthalmic solution, , Disp: , Rfl:   •  timolol (TIMOPTIC-XR) 0.5 % ophthalmic gel-forming, Apply  to eye(s) as directed by provider Daily., Disp: , Rfl:   •  traMADol (ULTRAM) 50 MG tablet, Take 1 tablet by mouth Every Night., Disp: 60 tablet, Rfl: 2  •  vitamin B-12 (CYANOCOBALAMIN) 1000 MCG tablet, VITAMIN B-12 1000 MCG TABS, Disp: , Rfl:   •  carbidopa-levodopa (SINEMET)  MG per tablet, , Disp: , Rfl:   •  Januvia 50 MG tablet, , Disp: , Rfl:   •  melatonin 5 MG tablet tablet, , Disp: , Rfl:     Allergies   Allergen Reactions   • Penicillins Unknown (See  Comments)   • Sulfa Antibiotics Unknown (See Comments)       Family History   Problem Relation Age of Onset   • Heart disease Other    • Stroke Mother    • Diabetes Mother        Past Surgical History:   Procedure Laterality Date   • CARDIAC PACEMAKER PLACEMENT  10/14/2016   • TOTAL ABDOMINAL HYSTERECTOMY  1978    (Ovaries remain)       Past Medical History:   Diagnosis Date   • Abdominal discomfort, generalized     Impression: May be the metformin. Have her stop this. Will check labs   • Abdominal distension     Impression: This may be due to a GI infection. She had a KUB ordered and read by me. The KUB was normal.   • Abnormal gait     Impression: Deconditioning, needs physical therapy   • Acute cystitis with hematuria     Impression: resolved   • Acute non-recurrent maxillary sinusitis     Impression: She was prescribed Cipro to treat her symptoms. She was given Depo/Dexa IM inj. Increase fluids. Tylenol/motrin for pain or fever. Medication and medication adverse effects discussed. Follow-up 5-7 days for reevaluation if not improved or sooner if needed. The insulin will make her bs increase and she will need to monitor her sugar.   • Anxiety     Impression: stable. No medications at this time.   • At high risk for falls     Impression: due to Parkinson's related weakness.   • Bacterial cystitis     Impression: ESBL with only 50,000 colonies and mild sx, not tested for Fosfomycin by due to the mild infection I feel it is better than more high risk drugs   • Benign essential HTN     Impression: Improved from her previous visit. stable on recheck. Her blood pressure can not support and ace or arb   • Benign essential tremor     Impression: stable, only worsens with eating and drinking.   • Benign positional vertigo, bilateral     Impression: Based on her symptoms she likely has BPV. She was given a handout on the Epley manuever. She was encouraged to RTC if her symptoms did not improve.   • Bilateral acute serous  otitis media     Impression: Improving. Advised patient to continue with claritin and add Flonase. If not improving, return to be re-evaluated.   • Bloody stool     Impression: CBC was ordered. Hemoccult was ordered. Monitor her symptoms.   • Boil of trunk     Impression: no fluctuance, discussed home care, return in a few days if it worsens   • Breast pain in female     Impression: will get diagnostic mammograms   • Bronchitis, acute     Impression: Increase fluids. Tylenol/motrin for pain or fever. Medication and medication adverse effects discussed. Follow-up 5-7 days for reevaluation if not improved or sooner if needed.   • Cholelithiasis     Impression: s/p gall bladder   • Colon cancer screening    • Constipation, chronic     Impression: start Linzess 72mcg daily. Continue Colace. Hold Miralx and Milk of mag for diarrhea.   • Costochondritis     Impression: ice, nsaids, Diagnosis, treatment and course discussed. Discussed medication, dosing and adverse effects. Return if there is worsening or persistance of symptoms Cause of chest pain   • Cough    • COVID-19    • Cramp of both lower extremities     Impression: labs ordered   • Depression     Impression: Stable on no medications. Monitor symptoms.   • Diabetes mellitus type 2 in obese (HCC)     Impression: Worsening She was advised to only take Metformin 1 tab BID. Glyburide was increased to BID. Continue to monitor sugars Goals developed at last visit were met Follow up in 4 months Care management needs are self addressed. 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 Consider a stool sample if does not improve.   • Diffuse lymphoblastic non-Hodgkin's lymphoma (HCC)     Impression: Stable. She has seen oncology   • Dizziness     Impression: resolved, much better    • DNR (do not resuscitate)     Recorded 04/24/2019 01:09 AM by Kenton Owens MD, [facility].   • Dysuria     Impression: send urine for culture prescription as below. She is going to see urology   • Edema of left lower extremity     Impression: us ordered- returned negative for DVT   • Encounter for screening for malignant neoplasm of other genitourinary organs     Impression: She declines   • Encounter for screening mammogram for malignant neoplasm of breast    • Eustachian tube dysfunction    • External hemorrhoid     Impression: from diarrhea, a little sore she says.   • Fatigue     Impression: check labs as above. possible UTI. Will get culture   • Gastroesophageal reflux disease     Impression: She was seen at [facility] and stayed for cardiac work up, found to be gerd.   • Generalized osteoarthritis     Impression: left knee   • Glaucoma     Impression: worsening per patient and she is seeing opthomologist   • Hearing difficulty    • Hematuria     Impression: urine culture sent, anitbiotc sent   • History of pulmonary embolism    • History of tobacco use    • Hypertriglyceridemia, essential    • Hypothyroidism, unspecified     Impression: Stable. Refills were given.   • Iatrogenic pulmonary embolism and infarction (HCC)     Impression: mulitple PE in both lungs, 6/2011. Currenlty on no Coumodin.   • Incisional pain     Impression: rash actually, irritated only   • Iron deficiency anemia     Impression: per low MCV on cbc. Check iron levels to see how bad it is. Start iron supplement.   • Kidney stone     Impression: found in ER, she has appt with urologist, Dr Everett   • Knee pain     Impression: right knee strain, monitor. nsaids and tylenol. recheck 2 weeks if not better   • Long term (current) use of oral hypoglycemic drugs    • Macular disorder      (Probable Diagnosis) Story: Seeing retinal specialist at [facility] 10/16/14   • Medicare annual wellness visit, subsequent     Story: She is on hospice care  for parkinson's.  Impression: Discussed injury prevention, diet and exercise, safe sexual practices, and screening for common diseases. Encouraged use of sunscreen and seatbelts. Avoidance of tobacco encouraged. Limitation or avoidance of alcohol encouraged. Recommend yearly dental and eye exams. Also discussed monitoring of blood pressure, lipids.   • Mixed hyperlipidemia     Impression: Discussed the need for her to remain on her statin. It was mutually decided that her Statin would be stopped on 07/06/17.   • Nausea     Impression: She was prescribed Zofran to treat her symptoms.   • Neuropathy of both feet     Impression: Secondary to diabetes and also parkinson's. Monofilament exam failed. Diabetic shoes recommended for this patient. Order faxed.   • Other chest pain     Impression: Work up negative in hospital. It was chest wall pain. She has a prescription for norco that Dr Moncada gave her.   • Overweight    • Parkinson disease (HCC)     Impression: Slowly worsening. Discussed disease progression. Discussed she will continue to get worse. Patient will need to be scheduled for a face to face. This will be done within the next 7 days.   • Physical deconditioning     Impression: from parkinsons and age. WIll set up home health   • Radiologic findings of lung field, abnormal    • Restless leg     Impression: Stable.   • Screening for hypercholesterolemia    • Screening for osteoporosis    • Senile osteoporosis     Impression: Diagnosis, treatment and course discussed. Discussed medication, dosing and adverse effects. Return if there is worsening or persistance of symptoms   • Shortness of breath     Impression: continue oxygen therapy.   • Skin tag    • Sternoclavicular joint pain, left     Impression: New diagnosis-Advised patient to do ROM exercsies, alternate heat/ice on neck and gentle massage. discussed more aggressive workup and treatment but pt declined   • Syncope     unspecified syncope type. Impression:  possibly heart. She is seeing Dr Moncada   • Tachycardia     Impression: check holter, send to cariologist- Dr Howell   • Tremor     Impression: familial vs parkinson's. Neurologist is leaning to familial type. He is adjusting her meds. She is seeing Dr Butts   • Type II diabetes mellitus, uncontrolled (HCC)     Impression: 140   • Upper back pain     Impression: muscular, discussed ice and otc tylenol. return if owrsens or not better in 2 weeks or sooner if needed   • URI (upper respiratory infection)     Impression: Increase fluids. Tylenol/motrin for pain or fever. Medication and medication adverse effects discussed. Follow-up 5-7 days for reevaluation if not improved or sooner if needed.   • Urinary frequency     Impression: could not urinate. Increase in urination at night, secodnary to new meds, Diagnosis, treatment and course discussed. Discussed medication, dosing and adverse effects. Return if there is worsening or persistance of symptoms Return for u/a next week. Let us know if change in meds, changes symptoms.   • Urinary retention     Impression: mild. see if it resolves with resolution of constipation   • UTI due to extended-spectrum beta lactamase (ESBL) producing Escherichia coli     Impression: pt has minimal sx with no evidence of pyloneph and only 50,000 colonies thus will treat with fosfomycin   • Viral gastroenteritis     Impression: feeling much better.   • Weakness     Story: secondary to Parkinson's. Impression: Went to the ER. Work up was negative in the ER. Discussed that since she declines medication for parkinson's that her disease will progress to issues with walking, talking, eating, all ADL's. She states understanding and her daughter does as well.       Family History   Problem Relation Age of Onset   • Heart disease Other    • Stroke Mother    • Diabetes Mother        Social History     Socioeconomic History   • Marital status:    Tobacco Use   • Smoking status: Former Smoker  "  • Smokeless tobacco: Never Used   Vaping Use   • Vaping Use: Never used   Substance and Sexual Activity   • Alcohol use: No   • Drug use: Never   • Sexual activity: Defer           ECG 12 Lead    Date/Time: 10/14/2021 10:57 AM  Performed by: Nancy Howell MD  Authorized by: Nancy Howell MD   Comparison: compared with previous ECG   Similar to previous ECG  Comparison to previous ECG: Dual-chamber pacemaker rhythm 65/min nonspecific ST-T wave changes left bundle branch block pattern no ectopy no significant change from previous EKG                Objective:       Physical Exam    /64   Pulse 61   Ht 162.6 cm (64\")   Wt 50.8 kg (112 lb)   SpO2 98%   BMI 19.22 kg/m²   The patient is alert, oriented and in no distress.    Vital signs as noted above.    Head and neck revealed no carotid bruits or jugular venous distension.  No thyromegaly or lymphadenopathy is present.    Lungs clear.  No wheezing.  Breath sounds are normal bilaterally.    Heart normal first and second heart sounds.  No murmur..  No pericardial rub is present.  No gallop is present.    Abdomen soft and nontender.  No organomegaly is present.    Extremities revealed good peripheral pulses without any pedal edema.    Skin warm and dry.  Pacemaker site looks normal.    Musculoskeletal system is grossly normal.    CNS grossly normal.    Reviewed and unchanged from last visit.        "

## 2022-02-09 ENCOUNTER — TELEPHONE (OUTPATIENT)
Dept: CARDIOLOGY | Facility: CLINIC | Age: 87
End: 2022-02-09

## 2022-02-09 NOTE — TELEPHONE ENCOUNTER
Pt's daughter called to ask why PT is on Eliquis because PT is currently in ER for bleeding and they were asking about her current meds, advised it was started in 10/2021 for AFIB.She understood. Advised her to contact us if we need to move appt up sooner.

## 2022-04-28 ENCOUNTER — CLINICAL SUPPORT NO REQUIREMENTS (OUTPATIENT)
Dept: CARDIOLOGY | Facility: CLINIC | Age: 87
End: 2022-04-28

## 2022-04-28 ENCOUNTER — OFFICE VISIT (OUTPATIENT)
Dept: CARDIOLOGY | Facility: CLINIC | Age: 87
End: 2022-04-28

## 2022-04-28 VITALS
OXYGEN SATURATION: 98 % | HEART RATE: 64 BPM | DIASTOLIC BLOOD PRESSURE: 69 MMHG | HEIGHT: 64 IN | WEIGHT: 113 LBS | BODY MASS INDEX: 19.29 KG/M2 | SYSTOLIC BLOOD PRESSURE: 112 MMHG

## 2022-04-28 DIAGNOSIS — I44.2 AV BLOCK, COMPLETE: ICD-10-CM

## 2022-04-28 DIAGNOSIS — I10 BENIGN ESSENTIAL HTN: ICD-10-CM

## 2022-04-28 DIAGNOSIS — Z95.0 PACEMAKER: Primary | ICD-10-CM

## 2022-04-28 DIAGNOSIS — E78.2 MIXED HYPERLIPIDEMIA: ICD-10-CM

## 2022-04-28 DIAGNOSIS — I48.0 PAROXYSMAL ATRIAL FIBRILLATION: ICD-10-CM

## 2022-04-28 PROCEDURE — 99214 OFFICE O/P EST MOD 30 MIN: CPT | Performed by: INTERNAL MEDICINE

## 2022-04-28 PROCEDURE — 93000 ELECTROCARDIOGRAM COMPLETE: CPT | Performed by: INTERNAL MEDICINE

## 2022-04-28 PROCEDURE — 93280 PM DEVICE PROGR EVAL DUAL: CPT | Performed by: INTERNAL MEDICINE

## 2022-04-28 NOTE — PROGRESS NOTES
Encounter Date:04/28/2022  Last seen 10/14/2021      Patient ID: Ashley Elkins is a 89 y.o. female.    Chief Complaint:  Pacemaker  History of atrial fibrillation  Dyslipidemia  Diabetes     History of Present Illness     Since I have last seen, the patient has been without any chest discomfort ,shortness of breath, palpitations, dizziness or syncope.  Denies having any headache ,abdominal pain ,nausea, vomiting , diarrhea constipation, loss of weight or loss of appetite.  Denies having any excessive bruising ,hematuria or blood in the stool.    Review of all systems negative except as indicated.    Reviewed ROS.    Assessment and Plan         ////////////////////////////    Impression  ==================  -status post permanent dual-chamber pacemaker implantation ([employer] Solartronic MRI compatible) and removal of loop recorder 10/14/2016     - history of dizziness and near-syncope.  improved since pacemaker implantation      - history of atrial fibrillation -maintaining sinus rhythm.  Paroxysmal atrial fibrillation     -chest pain-improved  Negative Lexiscan Cardiolite test July 2016.       - diabetes dyslipidemia parkinsonism and non-Hodgkin's lymphoma     - history of normal coronary arteries and hypercontractile left ventricle 2012.     - status post cholecystectomy appendectomy and hysterectomy     - allergy to penicillin and sulfa      - DNR  =====  =======  Status post pacemaker  Pacemaker site looks normal.  Interrogation of the pacemaker revealed excellent pacing parameters.  Intermittent atrial fibrillation  Battery status 4 years.     Paroxysmal atrial fibrillation.  (Seen on interrogation of the pacemaker)     Anticoagulation status was discussed.  Continue Eliquis 2.5 mg twice a day.  Observe for toxic effects.     Follow-up in office in 6 months with pacemaker interrogation.  Patient is staying at [facility].     Further plan will depend on patient's progress.    //////////I                      Diagnosis Plan   1. Pacemaker  ECG 12 Lead   2. AV block, complete (HCC)  ECG 12 Lead   3. Mixed hyperlipidemia  ECG 12 Lead   4. Benign essential HTN  ECG 12 Lead   5. Paroxysmal atrial fibrillation (HCC)  ECG 12 Lead   LAB RESULTS (LAST 7 DAYS)    CBC        BMP        CMP         BNP        TROPONIN        CoAg        Creatinine Clearance  CrCl cannot be calculated (Patient's most recent lab result is older than the maximum 30 days allowed.).    ABG        Radiology  No radiology results for the last day                The following portions of the patient's history were reviewed and updated as appropriate: allergies, current medications, past family history, past medical history, past social history, past surgical history and problem list.    Review of Systems   Constitutional: Negative for malaise/fatigue.   Cardiovascular: Negative for chest pain, leg swelling, palpitations and syncope.   Respiratory: Negative for shortness of breath.    Skin: Negative for rash.   Gastrointestinal: Negative for nausea and vomiting.   Neurological: Negative for dizziness, light-headedness and numbness.   All other systems reviewed and are negative.        Current Outpatient Medications:   •  acetaminophen (TYLENOL) 650 MG 8 hr tablet, Take  by mouth Every 8 (Eight) Hours., Disp: , Rfl:   •  ALLERGY RELIEF 10 MG tablet, , Disp: , Rfl:   •  apixaban (ELIQUIS) 2.5 MG tablet tablet, Take 2.5 mg by mouth 2 (Two) Times a Day., Disp: , Rfl:   •  BIOTIN PO, BIOTIN, Disp: , Rfl:   •  bisacodyl (DULCOLAX) 10 MG suppository, , Disp: , Rfl:   •  carbidopa-levodopa (SINEMET)  MG per tablet, , Disp: , Rfl:   •  gabapentin (NEURONTIN) 600 MG tablet, Take 600 mg by mouth Daily., Disp: , Rfl:   •  guaiFENesin (MUCINEX) 600 MG 12 hr tablet, Take 600 mg by mouth 2 (Two) Times a Day., Disp: , Rfl:   •  Januvia 50 MG tablet, , Disp: , Rfl:   •  latanoprost (XALATAN) 0.005 % ophthalmic solution, , Disp: , Rfl:   •  levothyroxine (SYNTHROID,  LEVOTHROID) 25 MCG tablet, Take 25 mcg by mouth Daily., Disp: , Rfl:   •  melatonin 5 MG tablet tablet, , Disp: , Rfl:   •  metFORMIN (GLUCOPHAGE) 1000 MG tablet, Take 1,000 mg by mouth Daily With Breakfast., Disp: , Rfl:   •  Multiple Vitamins-Minerals (OCUVITE PO), Take  by mouth., Disp: , Rfl:   •  pantoprazole (PROTONIX) 40 MG EC tablet, Daily., Disp: , Rfl:   •  polyethylene glycol (MIRALAX) 17 GM/SCOOP powder, , Disp: , Rfl:   •  sertraline (ZOLOFT) 50 MG tablet, Take 50 mg by mouth Daily., Disp: , Rfl:   •  traMADol (ULTRAM) 50 MG tablet, Take 1 tablet by mouth Every Night., Disp: 60 tablet, Rfl: 2  •  docusate sodium (COLACE) 100 MG capsule, Take 1 capsule by mouth., Disp: , Rfl:   •  gabapentin (NEURONTIN) 300 MG capsule, GABAPENTIN 300 MG CAPS, Disp: , Rfl:   •  LINZESS 72 MCG capsule capsule, 72 mcg Every Morning Before Breakfast., Disp: , Rfl:   •  magnesium oxide 250 MG tablet, Take  by mouth Daily., Disp: , Rfl:   •  MILK OF MAGNESIA 7.75 % suspension, , Disp: , Rfl:   •  montelukast (SINGULAIR) 10 MG tablet, Daily., Disp: , Rfl:   •  ondansetron (ZOFRAN) 4 MG tablet, , Disp: , Rfl:   •  Probiotic Product (PROBIOTIC DAILY PO), Daily., Disp: , Rfl:   •  promethazine (PHENERGAN) 25 MG tablet, Daily., Disp: , Rfl:   •  raNITIdine HCl (RANITIDINE 150 MAX STRENGTH PO), 1 tablet Daily., Disp: , Rfl:   •  sulindac (CLINORIL) 200 MG tablet, , Disp: , Rfl:   •  timolol (TIMOPTIC) 0.5 % ophthalmic solution, , Disp: , Rfl:   •  timolol (TIMOPTIC-XR) 0.5 % ophthalmic gel-forming, Apply  to eye(s) as directed by provider Daily., Disp: , Rfl:   •  vitamin B-12 (CYANOCOBALAMIN) 1000 MCG tablet, VITAMIN B-12 1000 MCG TABS, Disp: , Rfl:     Allergies   Allergen Reactions   • Penicillins Unknown (See Comments)   • Sulfa Antibiotics Unknown (See Comments)       Family History   Problem Relation Age of Onset   • Heart disease Other    • Stroke Mother    • Diabetes Mother        Past Surgical History:   Procedure  Laterality Date   • CARDIAC PACEMAKER PLACEMENT  10/14/2016   • TOTAL ABDOMINAL HYSTERECTOMY  1978    (Ovaries remain)       Past Medical History:   Diagnosis Date   • Abdominal discomfort, generalized     Impression: May be the metformin. Have her stop this. Will check labs   • Abdominal distension     Impression: This may be due to a GI infection. She had a KUB ordered and read by me. The KUB was normal.   • Abnormal gait     Impression: Deconditioning, needs physical therapy   • Acute cystitis with hematuria     Impression: resolved   • Acute non-recurrent maxillary sinusitis     Impression: She was prescribed Cipro to treat her symptoms. She was given Depo/Dexa IM inj. Increase fluids. Tylenol/motrin for pain or fever. Medication and medication adverse effects discussed. Follow-up 5-7 days for reevaluation if not improved or sooner if needed. The insulin will make her bs increase and she will need to monitor her sugar.   • Anxiety     Impression: stable. No medications at this time.   • At high risk for falls     Impression: due to Parkinson's related weakness.   • Bacterial cystitis     Impression: ESBL with only 50,000 colonies and mild sx, not tested for Fosfomycin by due to the mild infection I feel it is better than more high risk drugs   • Benign essential HTN     Impression: Improved from her previous visit. stable on recheck. Her blood pressure can not support and ace or arb   • Benign essential tremor     Impression: stable, only worsens with eating and drinking.   • Benign positional vertigo, bilateral     Impression: Based on her symptoms she likely has BPV. She was given a handout on the Epley manuever. She was encouraged to RTC if her symptoms did not improve.   • Bilateral acute serous otitis media     Impression: Improving. Advised patient to continue with claritin and add Flonase. If not improving, return to be re-evaluated.   • Bloody stool     Impression: CBC was ordered. Hemoccult was ordered.  Monitor her symptoms.   • Boil of trunk     Impression: no fluctuance, discussed home care, return in a few days if it worsens   • Breast pain in female     Impression: will get diagnostic mammograms   • Bronchitis, acute     Impression: Increase fluids. Tylenol/motrin for pain or fever. Medication and medication adverse effects discussed. Follow-up 5-7 days for reevaluation if not improved or sooner if needed.   • Cholelithiasis     Impression: s/p gall bladder   • Colon cancer screening    • Constipation, chronic     Impression: start Linzess 72mcg daily. Continue Colace. Hold Miralx and Milk of mag for diarrhea.   • Costochondritis     Impression: ice, nsaids, Diagnosis, treatment and course discussed. Discussed medication, dosing and adverse effects. Return if there is worsening or persistance of symptoms Cause of chest pain   • Cough    • COVID-19    • Cramp of both lower extremities     Impression: labs ordered   • Depression     Impression: Stable on no medications. Monitor symptoms.   • Diabetes mellitus type 2 in obese (HCC)     Impression: Worsening She was advised to only take Metformin 1 tab BID. Glyburide was increased to BID. Continue to monitor sugars Goals developed at last visit were met Follow up in 4 months Care management needs are self addressed. 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 Consider a stool sample if does not improve.   • Diffuse lymphoblastic non-Hodgkin's lymphoma (HCC)     Impression: Stable. She has seen oncology   • Dizziness     Impression: resolved, much better   • DNR (do not resuscitate)     Recorded 04/24/2019 01:09 AM by Kenton Owens MD, [facility].   • Dysuria     Impression: send urine for culture prescription as below. She is going to see urology   • Edema of left  lower extremity     Impression: us ordered- returned negative for DVT   • Encounter for screening for malignant neoplasm of other genitourinary organs     Impression: She declines   • Encounter for screening mammogram for malignant neoplasm of breast    • Eustachian tube dysfunction    • External hemorrhoid     Impression: from diarrhea, a little sore she says.   • Fatigue     Impression: check labs as above. possible UTI. Will get culture   • Gastroesophageal reflux disease     Impression: She was seen at [facility] and stayed for cardiac work up, found to be gerd.   • Generalized osteoarthritis     Impression: left knee   • Glaucoma     Impression: worsening per patient and she is seeing opthomologist   • Hearing difficulty    • Hematuria     Impression: urine culture sent, anitbiotc sent   • History of pulmonary embolism    • History of tobacco use    • Hypertriglyceridemia, essential    • Hypothyroidism, unspecified     Impression: Stable. Refills were given.   • Iatrogenic pulmonary embolism and infarction (HCC)     Impression: mulitple PE in both lungs, 6/2011. Currenlty on no Coumodin.   • Incisional pain     Impression: rash actually, irritated only   • Iron deficiency anemia     Impression: per low MCV on cbc. Check iron levels to see how bad it is. Start iron supplement.   • Kidney stone     Impression: found in ER, she has appt with urologist, Dr Everett   • Knee pain     Impression: right knee strain, monitor. nsaids and tylenol. recheck 2 weeks if not better   • Long term (current) use of oral hypoglycemic drugs    • Macular disorder      (Probable Diagnosis) Story: Seeing retinal specialist at [facility] 10/16/14   • Medicare annual wellness visit, subsequent     Story: She is on hospice care for parkinson's.  Impression: Discussed injury prevention, diet and exercise, safe sexual practices, and screening for common diseases. Encouraged use of sunscreen and seatbelts. Avoidance of tobacco encouraged.  Limitation or avoidance of alcohol encouraged. Recommend yearly dental and eye exams. Also discussed monitoring of blood pressure, lipids.   • Mixed hyperlipidemia     Impression: Discussed the need for her to remain on her statin. It was mutually decided that her Statin would be stopped on 07/06/17.   • Nausea     Impression: She was prescribed Zofran to treat her symptoms.   • Neuropathy of both feet     Impression: Secondary to diabetes and also parkinson's. Monofilament exam failed. Diabetic shoes recommended for this patient. Order faxed.   • Other chest pain     Impression: Work up negative in hospital. It was chest wall pain. She has a prescription for norco that Dr Moncada gave her.   • Overweight    • Parkinson disease (HCC)     Impression: Slowly worsening. Discussed disease progression. Discussed she will continue to get worse. Patient will need to be scheduled for a face to face. This will be done within the next 7 days.   • Physical deconditioning     Impression: from parkinsons and age. WIll set up home health   • Radiologic findings of lung field, abnormal    • Restless leg     Impression: Stable.   • Screening for hypercholesterolemia    • Screening for osteoporosis    • Senile osteoporosis     Impression: Diagnosis, treatment and course discussed. Discussed medication, dosing and adverse effects. Return if there is worsening or persistance of symptoms   • Shortness of breath     Impression: continue oxygen therapy.   • Skin tag    • Sternoclavicular joint pain, left     Impression: New diagnosis-Advised patient to do ROM exercsies, alternate heat/ice on neck and gentle massage. discussed more aggressive workup and treatment but pt declined   • Syncope     unspecified syncope type. Impression: possibly heart. She is seeing Dr Moncada   • Tachycardia     Impression: check holter, send to cariologist- Dr Howell   • Tremor     Impression: familial vs parkinson's. Neurologist is leaning to familial type. He  is adjusting her meds. She is seeing Dr Butts   • Type II diabetes mellitus, uncontrolled     Impression: 140   • Upper back pain     Impression: muscular, discussed ice and otc tylenol. return if owrsens or not better in 2 weeks or sooner if needed   • URI (upper respiratory infection)     Impression: Increase fluids. Tylenol/motrin for pain or fever. Medication and medication adverse effects discussed. Follow-up 5-7 days for reevaluation if not improved or sooner if needed.   • Urinary frequency     Impression: could not urinate. Increase in urination at night, secodnary to new meds, Diagnosis, treatment and course discussed. Discussed medication, dosing and adverse effects. Return if there is worsening or persistance of symptoms Return for u/a next week. Let us know if change in meds, changes symptoms.   • Urinary retention     Impression: mild. see if it resolves with resolution of constipation   • UTI due to extended-spectrum beta lactamase (ESBL) producing Escherichia coli     Impression: pt has minimal sx with no evidence of pyloneph and only 50,000 colonies thus will treat with fosfomycin   • Viral gastroenteritis     Impression: feeling much better.   • Weakness     Story: secondary to Parkinson's. Impression: Went to the ER. Work up was negative in the ER. Discussed that since she declines medication for parkinson's that her disease will progress to issues with walking, talking, eating, all ADL's. She states understanding and her daughter does as well.       Family History   Problem Relation Age of Onset   • Heart disease Other    • Stroke Mother    • Diabetes Mother        Social History     Socioeconomic History   • Marital status:    Tobacco Use   • Smoking status: Former Smoker   • Smokeless tobacco: Never Used   Vaping Use   • Vaping Use: Never used   Substance and Sexual Activity   • Alcohol use: No   • Drug use: Never   • Sexual activity: Defer           ECG 12 Lead    Date/Time: 4/28/2022  "10:05 AM  Performed by: Nancy Howell MD  Authorized by: Nancy Howell MD   Comparison: compared with previous ECG   Similar to previous ECG  Comparison to previous ECG: Atrial sensed ventricular paced rhythm 100/min nonspecific ST-T wave changes no ectopy                Objective:       Physical Exam    /69 (BP Location: Left arm, Patient Position: Sitting, Cuff Size: Adult)   Pulse 64   Ht 162.6 cm (64\")   Wt 51.3 kg (113 lb)   SpO2 98%   BMI 19.40 kg/m²   The patient is alert, oriented and in no distress.    Vital signs as noted above.    Head and neck revealed no carotid bruits or jugular venous distension.  No thyromegaly or lymphadenopathy is present.    Lungs clear.  No wheezing.  Breath sounds are normal bilaterally.    Heart normal first and second heart sounds.  No murmur..  No pericardial rub is present.  No gallop is present.    Abdomen soft and nontender.  No organomegaly is present.    Extremities revealed good peripheral pulses without any pedal edema.    Skin warm and dry.  Pacemaker site looks normal.    Musculoskeletal system is grossly normal.    CNS grossly normal.    Reviewed and updated.        "

## 2022-10-24 ENCOUNTER — TELEPHONE (OUTPATIENT)
Dept: CARDIOLOGY | Facility: CLINIC | Age: 87
End: 2022-10-24

## 2022-10-24 NOTE — TELEPHONE ENCOUNTER
LMOM for patient's daughter. No need to plug in monitor at this time, she needs to be next to monitor and manually check for monitor to work. Patient has appt with Dr Howell and device check on 11/10/22.

## 2022-10-24 NOTE — TELEPHONE ENCOUNTER
Daughter states her Mom's pm box has not been plugged in while [facility] is [facility], should box stay plugged? in?

## 2022-10-31 ENCOUNTER — TELEPHONE (OUTPATIENT)
Dept: CARDIOLOGY | Facility: CLINIC | Age: 87
End: 2022-10-31

## 2022-10-31 NOTE — TELEPHONE ENCOUNTER
Caller: KATHLEEN MORALES    Relationship: Emergency Contact    Best call back number: [phone]     What is the best time to reach you: ANY     Who are you requesting to speak with (clinical staff, provider,  specific staff member): ANY         What was the call regarding: PT NEVER RECEIVED A VOICEMAIL OR CALL FROM  ON 10-24-22. THE WRONG NUMBER WAS POSSIBLY LEFT TO CALL. PLEASE UPDATE PTS DAUGHTER AT NUMBER ABOVE.    Do you require a callback: YES

## 2022-11-15 ENCOUNTER — OFFICE VISIT (OUTPATIENT)
Dept: CARDIOLOGY | Facility: CLINIC | Age: 87
End: 2022-11-15

## 2022-11-15 ENCOUNTER — CLINICAL SUPPORT NO REQUIREMENTS (OUTPATIENT)
Dept: CARDIOLOGY | Facility: CLINIC | Age: 87
End: 2022-11-15

## 2022-11-15 VITALS — WEIGHT: 116 LBS | HEIGHT: 64 IN | BODY MASS INDEX: 19.81 KG/M2

## 2022-11-15 DIAGNOSIS — I44.2 AV BLOCK, COMPLETE: ICD-10-CM

## 2022-11-15 DIAGNOSIS — Z95.0 PACEMAKER: Primary | ICD-10-CM

## 2022-11-15 DIAGNOSIS — I48.0 PAROXYSMAL ATRIAL FIBRILLATION: ICD-10-CM

## 2022-11-15 DIAGNOSIS — I10 BENIGN ESSENTIAL HTN: ICD-10-CM

## 2022-11-15 DIAGNOSIS — E78.2 MIXED HYPERLIPIDEMIA: ICD-10-CM

## 2022-11-15 PROCEDURE — 99214 OFFICE O/P EST MOD 30 MIN: CPT | Performed by: INTERNAL MEDICINE

## 2022-11-15 PROCEDURE — 93000 ELECTROCARDIOGRAM COMPLETE: CPT | Performed by: INTERNAL MEDICINE

## 2022-11-15 PROCEDURE — 93280 PM DEVICE PROGR EVAL DUAL: CPT | Performed by: INTERNAL MEDICINE

## 2022-11-15 RX ORDER — ATORVASTATIN CALCIUM 10 MG/1
1 TABLET, FILM COATED ORAL DAILY
COMMUNITY
Start: 2022-11-02

## 2022-11-15 NOTE — PROGRESS NOTES
Encounter Date:11/15/2022  Last seen 4/28/2022      Patient ID: Ashley Elkins is a 89 y.o. female.    Chief Complaint:  Pacemaker  History of atrial fibrillation  Dyslipidemia  Diabetes     History of Present Illness     Since I have last seen, the patient has been without any chest discomfort ,shortness of breath, palpitations, dizziness or syncope.  Denies having any headache ,abdominal pain ,nausea, vomiting , diarrhea constipation, loss of weight or loss of appetite.  Denies having any excessive bruising ,hematuria or blood in the stool.    Review of all systems negative except as indicated.    Reviewed ROS.  Assessment and Plan         ////////////////////////////    Impression  ==================  -status post permanent dual-chamber pacemaker implantation (Gigabit Squaredtronic MRI compatible) and removal of loop recorder 10/14/2016     - history of dizziness and near-syncope.  improved since pacemaker implantation      - history of atrial fibrillation -maintaining sinus rhythm.  Paroxysmal atrial fibrillation     -chest pain-improved  Negative Lexiscan Cardiolite test July 2016.       - diabetes dyslipidemia parkinsonism and non-Hodgkin's lymphoma     - history of normal coronary arteries and hypercontractile left ventricle 2012.     - status post cholecystectomy appendectomy and hysterectomy     - allergy to penicillin and sulfa      - DNR  =====  =======  Status post pacemaker  Pacemaker site looks normal.  Interrogation of the pacemaker revealed excellent pacing parameters.  Intermittent atrial fibrillation  Battery status 3.5 years.     Paroxysmal atrial fibrillation.  (Seen on interrogation of the pacemaker)     Anticoagulation status reviewed  Continue Eliquis 2.5 mg twice a day.  Observe for toxic effects.     Follow-up in office in 6 months with pacemaker interrogation.    Patient is staying at [facility].     Further plan will depend on patient's progress.    //////////I                      Diagnosis Plan   1. Pacemaker  ECG 12 Lead      2. AV block, complete (HCC)  ECG 12 Lead      3. Mixed hyperlipidemia  ECG 12 Lead      4. Benign essential HTN  ECG 12 Lead      5. Paroxysmal atrial fibrillation (HCC)  ECG 12 Lead      LAB RESULTS (LAST 7 DAYS)    CBC        BMP        CMP         BNP        TROPONIN        CoAg        Creatinine Clearance  CrCl cannot be calculated (Patient's most recent lab result is older than the maximum 30 days allowed.).    ABG        Radiology  No radiology results for the last day                The following portions of the patient's history were reviewed and updated as appropriate: allergies, current medications, past family history, past medical history, past social history, past surgical history and problem list.    Review of Systems   Constitutional: Negative for malaise/fatigue.   Cardiovascular: Negative for chest pain, leg swelling, palpitations and syncope.   Respiratory: Negative for shortness of breath.    Skin: Negative for rash.   Gastrointestinal: Negative for nausea and vomiting.   Neurological: Positive for light-headedness. Negative for dizziness and numbness.   All other systems reviewed and are negative.        Current Outpatient Medications:   •  acetaminophen (TYLENOL) 650 MG 8 hr tablet, Take  by mouth Every 8 (Eight) Hours., Disp: , Rfl:   •  ALLERGY RELIEF 10 MG tablet, , Disp: , Rfl:   •  apixaban (ELIQUIS) 2.5 MG tablet tablet, Take 2.5 mg by mouth 2 (Two) Times a Day., Disp: , Rfl:   •  atorvastatin (LIPITOR) 10 MG tablet, Take 1 tablet by mouth Daily., Disp: , Rfl:   •  BIOTIN PO, BIOTIN, Disp: , Rfl:   •  bisacodyl (DULCOLAX) 10 MG suppository, , Disp: , Rfl:   •  carbidopa-levodopa (SINEMET)  MG per tablet, , Disp: , Rfl:   •  docusate sodium (COLACE) 100 MG capsule, Take 1 capsule by mouth., Disp: , Rfl:   •  gabapentin (NEURONTIN) 600 MG tablet, Take 600 mg by mouth Daily., Disp: , Rfl:   •  guaiFENesin (MUCINEX) 600 MG 12 hr tablet,  Take 600 mg by mouth 2 (Two) Times a Day., Disp: , Rfl:   •  Januvia 50 MG tablet, , Disp: , Rfl:   •  latanoprost (XALATAN) 0.005 % ophthalmic solution, , Disp: , Rfl:   •  levothyroxine (SYNTHROID, LEVOTHROID) 25 MCG tablet, Take 25 mcg by mouth Daily., Disp: , Rfl:   •  melatonin 5 MG tablet tablet, , Disp: , Rfl:   •  metFORMIN (GLUCOPHAGE) 1000 MG tablet, Take 1,000 mg by mouth Daily With Breakfast., Disp: , Rfl:   •  MILK OF MAGNESIA 7.75 % suspension, , Disp: , Rfl:   •  Multiple Vitamins-Minerals (OCUVITE PO), Take  by mouth., Disp: , Rfl:   •  pantoprazole (PROTONIX) 40 MG EC tablet, Daily., Disp: , Rfl:   •  polyethylene glycol (MIRALAX) 17 GM/SCOOP powder, , Disp: , Rfl:   •  Probiotic Product (PROBIOTIC DAILY PO), Daily., Disp: , Rfl:   •  promethazine (PHENERGAN) 25 MG tablet, Daily., Disp: , Rfl:   •  sertraline (ZOLOFT) 50 MG tablet, Take 75 mg by mouth Daily., Disp: , Rfl:   •  timolol (TIMOPTIC) 0.5 % ophthalmic solution, , Disp: , Rfl:   •  traMADol (ULTRAM) 50 MG tablet, Take 1 tablet by mouth Every Night., Disp: 60 tablet, Rfl: 2    Allergies   Allergen Reactions   • Penicillins Unknown (See Comments)   • Sulfa Antibiotics Unknown (See Comments)       Family History   Problem Relation Age of Onset   • Heart disease Other    • Stroke Mother    • Diabetes Mother        Past Surgical History:   Procedure Laterality Date   • CARDIAC PACEMAKER PLACEMENT  10/14/2016   • TOTAL ABDOMINAL HYSTERECTOMY  1978    (Ovaries remain)       Past Medical History:   Diagnosis Date   • Abdominal discomfort, generalized     Impression: May be the metformin. Have her stop this. Will check labs   • Abdominal distension     Impression: This may be due to a GI infection. She had a KUB ordered and read by me. The KUB was normal.   • Abnormal gait     Impression: Deconditioning, needs physical therapy   • Acute cystitis with hematuria     Impression: resolved   • Acute non-recurrent maxillary sinusitis     Impression: She  was prescribed Cipro to treat her symptoms. She was given Depo/Dexa IM inj. Increase fluids. Tylenol/motrin for pain or fever. Medication and medication adverse effects discussed. Follow-up 5-7 days for reevaluation if not improved or sooner if needed. The insulin will make her bs increase and she will need to monitor her sugar.   • Anxiety     Impression: stable. No medications at this time.   • At high risk for falls     Impression: due to Parkinson's related weakness.   • Bacterial cystitis     Impression: ESBL with only 50,000 colonies and mild sx, not tested for Fosfomycin by due to the mild infection I feel it is better than more high risk drugs   • Benign essential HTN     Impression: Improved from her previous visit. stable on recheck. Her blood pressure can not support and ace or arb   • Benign essential tremor     Impression: stable, only worsens with eating and drinking.   • Benign positional vertigo, bilateral     Impression: Based on her symptoms she likely has BPV. She was given a handout on the Epley manuever. She was encouraged to RTC if her symptoms did not improve.   • Bilateral acute serous otitis media     Impression: Improving. Advised patient to continue with claritin and add Flonase. If not improving, return to be re-evaluated.   • Bloody stool     Impression: CBC was ordered. Hemoccult was ordered. Monitor her symptoms.   • Boil of trunk     Impression: no fluctuance, discussed home care, return in a few days if it worsens   • Breast pain in female     Impression: will get diagnostic mammograms   • Bronchitis, acute     Impression: Increase fluids. Tylenol/motrin for pain or fever. Medication and medication adverse effects discussed. Follow-up 5-7 days for reevaluation if not improved or sooner if needed.   • Cholelithiasis     Impression: s/p gall bladder   • Colon cancer screening    • Constipation, chronic     Impression: start Linzess 72mcg daily. Continue Colace. Hold Miralx and Milk of  mag for diarrhea.   • Costochondritis     Impression: ice, nsaids, Diagnosis, treatment and course discussed. Discussed medication, dosing and adverse effects. Return if there is worsening or persistance of symptoms Cause of chest pain   • Cough    • COVID-19    • Cramp of both lower extremities     Impression: labs ordered   • Depression     Impression: Stable on no medications. Monitor symptoms.   • Diabetes mellitus type 2 in obese (HCC)     Impression: Worsening She was advised to only take Metformin 1 tab BID. Glyburide was increased to BID. Continue to monitor sugars Goals developed at last visit were met Follow up in 4 months Care management needs are self addressed. 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 Consider a stool sample if does not improve.   • Diffuse lymphoblastic non-Hodgkin's lymphoma (HCC)     Impression: Stable. She has seen oncology   • Dizziness     Impression: resolved, much better   • DNR (do not resuscitate)     Recorded 04/24/2019 01:09 AM by Kenton Owens MD, [facility] [employer] [facility].   • Dysuria     Impression: send urine for culture prescription as below. She is going to see urology   • Edema of left lower extremity     Impression: us ordered- returned negative for DVT   • Encounter for screening for malignant neoplasm of other genitourinary organs     Impression: She declines   • Encounter for screening mammogram for malignant neoplasm of breast    • Eustachian tube dysfunction    • External hemorrhoid     Impression: from diarrhea, a little sore she says.   • Fatigue     Impression: check labs as above. possible UTI. Will get culture   • Gastroesophageal reflux disease     Impression: She was seen at [facility] and stayed for cardiac work up, found to be gerd.   • Generalized osteoarthritis      Impression: left knee   • Glaucoma     Impression: worsening per patient and she is seeing opthomologist   • Hearing difficulty    • Hematuria     Impression: urine culture sent, anitbiotc sent   • History of pulmonary embolism    • History of tobacco use    • Hypertriglyceridemia, essential    • Hypothyroidism, unspecified     Impression: Stable. Refills were given.   • Iatrogenic pulmonary embolism and infarction (HCC)     Impression: mulitple PE in both lungs, 6/2011. Currenlty on no Coumodin.   • Incisional pain     Impression: rash actually, irritated only   • Iron deficiency anemia     Impression: per low MCV on cbc. Check iron levels to see how bad it is. Start iron supplement.   • Kidney stone     Impression: found in ER, she has appt with urologist, Dr Everett   • Knee pain     Impression: right knee strain, monitor. nsaids and tylenol. recheck 2 weeks if not better   • Long term (current) use of oral hypoglycemic drugs    • Macular disorder      (Probable Diagnosis) Story: Seeing retinal specialist at [facility] 10/16/14   • Medicare annual wellness visit, subsequent     Story: She is on hospice care for parkinson's.  Impression: Discussed injury prevention, diet and exercise, safe sexual practices, and screening for common diseases. Encouraged use of sunscreen and seatbelts. Avoidance of tobacco encouraged. Limitation or avoidance of alcohol encouraged. Recommend yearly dental and eye exams. Also discussed monitoring of blood pressure, lipids.   • Mixed hyperlipidemia     Impression: Discussed the need for her to remain on her statin. It was mutually decided that her Statin would be stopped on 07/06/17.   • Nausea     Impression: She was prescribed Zofran to treat her symptoms.   • Neuropathy of both feet     Impression: Secondary to diabetes and also parkinson's. Monofilament exam failed. Diabetic shoes recommended for this patient. Order faxed.   • Other chest pain     Impression: Work up negative  in hospital. It was chest wall pain. She has a prescription for norco that Dr Moncada gave her.   • Overweight    • Parkinson disease (HCC)     Impression: Slowly worsening. Discussed disease progression. Discussed she will continue to get worse. Patient will need to be scheduled for a face to face. This will be done within the next 7 days.   • Physical deconditioning     Impression: from parkinsons and age. WIll set up home health   • Radiologic findings of lung field, abnormal    • Restless leg     Impression: Stable.   • Screening for hypercholesterolemia    • Screening for osteoporosis    • Senile osteoporosis     Impression: Diagnosis, treatment and course discussed. Discussed medication, dosing and adverse effects. Return if there is worsening or persistance of symptoms   • Shortness of breath     Impression: continue oxygen therapy.   • Skin tag    • Sternoclavicular joint pain, left     Impression: New diagnosis-Advised patient to do ROM exercsies, alternate heat/ice on neck and gentle massage. discussed more aggressive workup and treatment but pt declined   • Syncope     unspecified syncope type. Impression: possibly heart. She is seeing Dr Moncada   • Tachycardia     Impression: check holter, send to cariologist- Dr Howell   • Tremor     Impression: familial vs parkinson's. Neurologist is leaning to familial type. He is adjusting her meds. She is seeing Dr Butts   • Type II diabetes mellitus, uncontrolled     Impression: 140   • Upper back pain     Impression: muscular, discussed ice and otc tylenol. return if owrsens or not better in 2 weeks or sooner if needed   • URI (upper respiratory infection)     Impression: Increase fluids. Tylenol/motrin for pain or fever. Medication and medication adverse effects discussed. Follow-up 5-7 days for reevaluation if not improved or sooner if needed.   • Urinary frequency     Impression: could not urinate. Increase in urination at night, secodnary to new meds,  "Diagnosis, treatment and course discussed. Discussed medication, dosing and adverse effects. Return if there is worsening or persistance of symptoms Return for u/a next week. Let us know if change in meds, changes symptoms.   • Urinary retention     Impression: mild. see if it resolves with resolution of constipation   • UTI due to extended-spectrum beta lactamase (ESBL) producing Escherichia coli     Impression: pt has minimal sx with no evidence of pyloneph and only 50,000 colonies thus will treat with fosfomycin   • Viral gastroenteritis     Impression: feeling much better.   • Weakness     Story: secondary to Parkinson's. Impression: Went to the ER. Work up was negative in the ER. Discussed that since she declines medication for parkinson's that her disease will progress to issues with walking, talking, eating, all ADL's. She states understanding and her daughter does as well.       Family History   Problem Relation Age of Onset   • Heart disease Other    • Stroke Mother    • Diabetes Mother        Social History     Socioeconomic History   • Marital status:    Tobacco Use   • Smoking status: Former   • Smokeless tobacco: Never   Vaping Use   • Vaping Use: Never used   Substance and Sexual Activity   • Alcohol use: No   • Drug use: Never   • Sexual activity: Defer           ECG 12 Lead    Date/Time: 11/15/2022 2:37 PM  Performed by: Nancy Howell MD  Authorized by: Nancy Howell MD   Comparison: compared with previous ECG   Similar to previous ECG  Comparison to previous ECG: Atrial sensed ventricular paced rhythm 99/min nonspecific ST-T wave changes no ectopy no significant change from 4/28/2022                Objective:       Physical Exam    Ht 162.6 cm (64\")   Wt 52.6 kg (116 lb)   BMI 19.91 kg/m²   The patient is alert, oriented and in no distress.    Vital signs as noted above.    Head and neck revealed no carotid bruits or jugular venous distension.  No thyromegaly or lymphadenopathy is " present.    Lungs clear.  No wheezing.  Breath sounds are normal bilaterally.    Heart normal first and second heart sounds.  No murmur..  No pericardial rub is present.  No gallop is present.    Abdomen soft and nontender.  No organomegaly is present.    Extremities revealed good peripheral pulses without any pedal edema.    Skin warm and dry.  Pacemaker site looks normal.    Musculoskeletal system is grossly normal.    CNS grossly normal.    Reviewed and updated.

## 2022-11-29 NOTE — TELEPHONE ENCOUNTER
Patient seen on 11/15/22, monitor at home and patient is in facility. Not connecting at this time.

## 2024-02-02 ENCOUNTER — TELEPHONE (OUTPATIENT)
Dept: CARDIOLOGY | Facility: CLINIC | Age: 89
End: 2024-02-02
Payer: MEDICARE

## 2024-02-02 NOTE — TELEPHONE ENCOUNTER
[facility] staff attempted to follow warm transfer process and was unsuccessful     Caller: [facility]    Relationship to patient: Provider    Best call back number: [phone]    Patient is needing: PROVIDER NEEDING TO RESCHEDULE DEVICE CHECK, NO APPOINTMENTS AVAILABLE ATTEMPTED TO TRANSFER

## 2024-02-15 ENCOUNTER — OFFICE VISIT (OUTPATIENT)
Dept: CARDIOLOGY | Facility: CLINIC | Age: 89
End: 2024-02-15
Payer: MEDICARE

## 2024-02-15 ENCOUNTER — CLINICAL SUPPORT NO REQUIREMENTS (OUTPATIENT)
Dept: CARDIOLOGY | Facility: CLINIC | Age: 89
End: 2024-02-15
Payer: MEDICARE

## 2024-02-15 VITALS
SYSTOLIC BLOOD PRESSURE: 127 MMHG | HEIGHT: 64 IN | OXYGEN SATURATION: 94 % | HEART RATE: 100 BPM | DIASTOLIC BLOOD PRESSURE: 78 MMHG | BODY MASS INDEX: 20.83 KG/M2 | WEIGHT: 122 LBS

## 2024-02-15 DIAGNOSIS — I44.2 AV BLOCK, COMPLETE: Primary | ICD-10-CM

## 2024-02-15 DIAGNOSIS — E78.2 MIXED HYPERLIPIDEMIA: ICD-10-CM

## 2024-02-15 DIAGNOSIS — I10 BENIGN ESSENTIAL HTN: ICD-10-CM

## 2024-02-15 DIAGNOSIS — Z95.0 PACEMAKER: ICD-10-CM

## 2024-02-15 DIAGNOSIS — Z95.0 PACEMAKER: Primary | ICD-10-CM

## 2024-02-15 DIAGNOSIS — I48.0 PAROXYSMAL ATRIAL FIBRILLATION: ICD-10-CM

## 2024-02-15 DIAGNOSIS — I44.2 AV BLOCK, COMPLETE: ICD-10-CM

## 2024-09-12 NOTE — PROGRESS NOTES
Encounter Date:10/02/2024  Last seen 2/15/2024      Patient ID: Ashley Elkins is a 89 y.o. female.      Chief Complaint:     Pacemaker  History of atrial fibrillation  Dyslipidemia  Diabetes     History of Present Illness     Since I have last seen, the patient has been without any chest discomfort ,shortness of breath, palpitations, dizziness or syncope.  Denies having any headache ,abdominal pain ,nausea, vomiting , diarrhea constipation, loss of weight or loss of appetite.  Denies having any excessive bruising ,hematuria or blood in the stool.    Review of all systems negative except as indicated.    Reviewed ROS.  Assessment and Plan         ////////////////////////////  History  ==================  -status post permanent dual-chamber pacemaker implantation (4-Tell MRI compatible) and removal of loop recorder 10/14/2016     - history of dizziness and near-syncope.  improved since pacemaker implantation      - history of atrial fibrillation -maintaining sinus rhythm.  Paroxysmal atrial fibrillation     -Anticoagulation-on Eliquis     -chest pain-improved  Negative Lexiscan Cardiolite test July 2016.       - diabetes dyslipidemia parkinsonism and non-Hodgkin's lymphoma     - history of normal coronary arteries and hypercontractile left ventricle 2012.     - status post cholecystectomy appendectomy and hysterectomy     - allergy to penicillin and sulfa      - DNR  =====  Plan  =======  Status post pacemaker  Pacemaker site looks normal.  Interrogation of the pacemaker revealed excellent pacing parameters.-10/2/2024  Intermittent atrial fibrillation  Battery status 2 years.     Paroxysmal atrial fibrillation.  (Seen on interrogation of the pacemaker)     Anticoagulation status reviewed  Continue Eliquis 2.5 mg twice a day.  Observe for toxic effects.    Hypothyroidism-on Levoxyl.    Diabetes-on medications.    Dyslipidemia continue atorvastatin     Follow-up in office in 6 months with pacemaker interrogation.      Patient is staying at [facility].     Further plan will depend on patient's progress.     Reviewed updated 10/2/2024.  /////////I              Diagnosis Plan   1. Pacemaker        2. Benign essential HTN        3. Mixed hyperlipidemia        4. Paroxysmal atrial fibrillation        5. AV block, complete        LAB RESULTS (LAST 7 DAYS)    CBC        BMP        CMP         BNP        TROPONIN        CoAg        Creatinine Clearance  CrCl cannot be calculated (Patient's most recent lab result is older than the maximum 30 days allowed.).    ABG        Radiology  No radiology results for the last day                The following portions of the patient's history were reviewed and updated as appropriate: allergies, current medications, past family history, past medical history, past social history, past surgical history, and problem list.    Review of Systems   Constitutional: Negative for malaise/fatigue.   Cardiovascular:  Negative for chest pain, dyspnea on exertion, leg swelling and palpitations.   Respiratory:  Negative for cough and shortness of breath.    Gastrointestinal:  Negative for abdominal pain, nausea and vomiting.   Neurological:  Negative for dizziness, focal weakness, headaches, light-headedness and numbness.   All other systems reviewed and are negative.      Current Outpatient Medications:     acetaminophen (TYLENOL) 650 MG 8 hr tablet, Take  by mouth Every 8 (Eight) Hours., Disp: , Rfl:     ALLERGY RELIEF 10 MG tablet, , Disp: , Rfl:     apixaban (ELIQUIS) 2.5 MG tablet tablet, Take 1 tablet by mouth 2 (Two) Times a Day., Disp: , Rfl:     atorvastatin (LIPITOR) 10 MG tablet, Take 1 tablet by mouth Daily., Disp: , Rfl:     BIOTIN PO, BIOTIN, Disp: , Rfl:     bisacodyl (DULCOLAX) 10 MG suppository, , Disp: , Rfl:     carbidopa-levodopa (SINEMET)  MG per tablet, , Disp: , Rfl:     docusate sodium (COLACE) 100 MG capsule, Take 1 capsule by mouth., Disp: , Rfl:     gabapentin  (NEURONTIN) 600 MG tablet, Take 1 tablet by mouth Daily., Disp: , Rfl:     guaiFENesin (MUCINEX) 600 MG 12 hr tablet, Take 1 tablet by mouth 2 (Two) Times a Day., Disp: , Rfl:     Januvia 50 MG tablet, , Disp: , Rfl:     latanoprost (XALATAN) 0.005 % ophthalmic solution, , Disp: , Rfl:     levothyroxine (SYNTHROID, LEVOTHROID) 25 MCG tablet, Take 1 tablet by mouth Daily., Disp: , Rfl:     melatonin 5 MG tablet tablet, , Disp: , Rfl:     metFORMIN (GLUCOPHAGE) 1000 MG tablet, Take 1 tablet by mouth Daily With Breakfast., Disp: , Rfl:     MILK OF MAGNESIA 7.75 % suspension, , Disp: , Rfl:     mirtazapine (REMERON) 7.5 MG tablet, , Disp: , Rfl:     Multiple Vitamins-Minerals (OCUVITE PO), Take  by mouth., Disp: , Rfl:     pantoprazole (PROTONIX) 20 MG EC tablet, , Disp: , Rfl:     polyethylene glycol (MIRALAX) 17 GM/SCOOP powder, , Disp: , Rfl:     Probiotic Product (PROBIOTIC DAILY PO), Daily., Disp: , Rfl:     promethazine (PHENERGAN) 25 MG tablet, Daily., Disp: , Rfl:     sertraline (ZOLOFT) 100 MG tablet, , Disp: , Rfl:     timolol (TIMOPTIC) 0.5 % ophthalmic solution, , Disp: , Rfl:     traMADol (ULTRAM) 50 MG tablet, Take 1 tablet by mouth Every Night., Disp: 60 tablet, Rfl: 2    Allergies   Allergen Reactions    Penicillins Unknown (See Comments)    Sulfa Antibiotics Unknown (See Comments)       Family History   Problem Relation Age of Onset    Heart disease Other     Stroke Mother     Diabetes Mother        Past Surgical History:   Procedure Laterality Date    CARDIAC PACEMAKER PLACEMENT  10/14/2016    TOTAL ABDOMINAL HYSTERECTOMY  1978    (Ovaries remain)       Past Medical History:   Diagnosis Date    Abdominal discomfort, generalized     Impression: May be the metformin. Have her stop this. Will check labs    Abdominal distension     Impression: This may be due to a GI infection. She had a KUB ordered and read by me. The KUB was normal.    Abnormal gait     Impression: Deconditioning, needs physical therapy     Acute cystitis with hematuria     Impression: resolved    Acute non-recurrent maxillary sinusitis     Impression: She was prescribed Cipro to treat her symptoms. She was given Depo/Dexa IM inj. Increase fluids. Tylenol/motrin for pain or fever. Medication and medication adverse effects discussed. Follow-up 5-7 days for reevaluation if not improved or sooner if needed. The insulin will make her bs increase and she will need to monitor her sugar.    Anxiety     Impression: stable. No medications at this time.    At high risk for falls     Impression: due to Parkinson's related weakness.    Bacterial cystitis     Impression: ESBL with only 50,000 colonies and mild sx, not tested for Fosfomycin by due to the mild infection I feel it is better than more high risk drugs    Benign essential HTN     Impression: Improved from her previous visit. stable on recheck. Her blood pressure can not support and ace or arb    Benign essential tremor     Impression: stable, only worsens with eating and drinking.    Benign positional vertigo, bilateral     Impression: Based on her symptoms she likely has BPV. She was given a handout on the Epley manuever. She was encouraged to RTC if her symptoms did not improve.    Bilateral acute serous otitis media     Impression: Improving. Advised patient to continue with claritin and add Flonase. If not improving, return to be re-evaluated.    Bloody stool     Impression: CBC was ordered. Hemoccult was ordered. Monitor her symptoms.    Boil of trunk     Impression: no fluctuance, discussed home care, return in a few days if it worsens    Breast pain in female     Impression: will get diagnostic mammograms    Bronchitis, acute     Impression: Increase fluids. Tylenol/motrin for pain or fever. Medication and medication adverse effects discussed. Follow-up 5-7 days for reevaluation if not improved or sooner if needed.    Cholelithiasis     Impression: s/p gall bladder    Colon cancer screening      Constipation, chronic     Impression: start Linzess 72mcg daily. Continue Colace. Hold Miralx and Milk of mag for diarrhea.    Costochondritis     Impression: ice, nsaids, Diagnosis, treatment and course discussed. Discussed medication, dosing and adverse effects. Return if there is worsening or persistance of symptoms Cause of chest pain    Cough     COVID-19     Cramp of both lower extremities     Impression: labs ordered    Depression     Impression: Stable on no medications. Monitor symptoms.    Diabetes mellitus type 2 in obese     Impression: Worsening She was advised to only take Metformin 1 tab BID. Glyburide was increased to BID. Continue to monitor sugars Goals developed at last visit were met Follow up in 4 months Care management needs are self addressed. 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 Consider a stool sample if does not improve.    Diffuse lymphoblastic non-Hodgkin's lymphoma     Impression: Stable. She has seen oncology    Dizziness     Impression: resolved, much better    DNR (do not resuscitate)     Recorded 04/24/2019 01:09 AM by Kenton Owens MD, [facility].    Dysuria     Impression: send urine for culture prescription as below. She is going to see urology    Edema of left lower extremity     Impression: us ordered- returned negative for DVT    Encounter for screening for malignant neoplasm of other genitourinary organs     Impression: She declines    Encounter for screening mammogram for malignant neoplasm of breast     Eustachian tube dysfunction     External hemorrhoid     Impression: from diarrhea, a little sore she says.    Fatigue     Impression: check labs as above. possible UTI. Will get culture    Gastroesophageal reflux disease     Impression: She was seen at [facility] and stayed for cardiac  work up, found to be gerd.    Generalized osteoarthritis     Impression: left knee    Glaucoma     Impression: worsening per patient and she is seeing opthomologist    Hearing difficulty     Hematuria     Impression: urine culture sent, anitbiotc sent    History of pulmonary embolism     History of tobacco use     Hypertriglyceridemia, essential     Hypothyroidism, unspecified     Impression: Stable. Refills were given.    Iatrogenic pulmonary embolism and infarction     Impression: mulitple PE in both lungs, 6/2011. Currenlty on no Coumodin.    Incisional pain     Impression: rash actually, irritated only    Iron deficiency anemia     Impression: per low MCV on cbc. Check iron levels to see how bad it is. Start iron supplement.    Kidney stone     Impression: found in ER, she has appt with urologist, Dr Everett    Knee pain     Impression: right knee strain, monitor. nsaids and tylenol. recheck 2 weeks if not better    Long term (current) use of oral hypoglycemic drugs     Macular disorder      (Probable Diagnosis) Story: Seeing retinal specialist at [facility] 10/16/14    Medicare annual wellness visit, subsequent     Story: She is on hospice care for parkinson's.  Impression: Discussed injury prevention, diet and exercise, safe sexual practices, and screening for common diseases. Encouraged use of sunscreen and seatbelts. Avoidance of tobacco encouraged. Limitation or avoidance of alcohol encouraged. Recommend yearly dental and eye exams. Also discussed monitoring of blood pressure, lipids.    Mixed hyperlipidemia     Impression: Discussed the need for her to remain on her statin. It was mutually decided that her Statin would be stopped on 07/06/17.    Nausea     Impression: She was prescribed Zofran to treat her symptoms.    Neuropathy of both feet     Impression: Secondary to diabetes and also parkinson's. Monofilament exam failed. Diabetic shoes recommended for this patient. Order faxed.    Other chest  pain     Impression: Work up negative in hospital. It was chest wall pain. She has a prescription for norco that Dr Moncada gave her.    Overweight     Parkinson disease     Impression: Slowly worsening. Discussed disease progression. Discussed she will continue to get worse. Patient will need to be scheduled for a face to face. This will be done within the next 7 days.    Physical deconditioning     Impression: from parkinsons and age. WIll set up home health    Radiologic findings of lung field, abnormal     Restless leg     Impression: Stable.    Screening for hypercholesterolemia     Screening for osteoporosis     Senile osteoporosis     Impression: Diagnosis, treatment and course discussed. Discussed medication, dosing and adverse effects. Return if there is worsening or persistance of symptoms    Shortness of breath     Impression: continue oxygen therapy.    Skin tag     Sternoclavicular joint pain, left     Impression: New diagnosis-Advised patient to do ROM exercsies, alternate heat/ice on neck and gentle massage. discussed more aggressive workup and treatment but pt declined    Syncope     unspecified syncope type. Impression: possibly heart. She is seeing Dr Moncada    Tachycardia     Impression: check holter, send to cariologist- Dr Howell    Tremor     Impression: familial vs parkinson's. Neurologist is leaning to familial type. He is adjusting her meds. She is seeing Dr Butts    Type II diabetes mellitus, uncontrolled     Impression: 140    Upper back pain     Impression: muscular, discussed ice and otc tylenol. return if owrsens or not better in 2 weeks or sooner if needed    URI (upper respiratory infection)     Impression: Increase fluids. Tylenol/motrin for pain or fever. Medication and medication adverse effects discussed. Follow-up 5-7 days for reevaluation if not improved or sooner if needed.    Urinary frequency     Impression: could not urinate. Increase in urination at night, secodnary to new  meds, Diagnosis, treatment and course discussed. Discussed medication, dosing and adverse effects. Return if there is worsening or persistance of symptoms Return for u/a next week. Let us know if change in meds, changes symptoms.    Urinary retention     Impression: mild. see if it resolves with resolution of constipation    UTI due to extended-spectrum beta lactamase (ESBL) producing Escherichia coli     Impression: pt has minimal sx with no evidence of pyloneph and only 50,000 colonies thus will treat with fosfomycin    Viral gastroenteritis     Impression: feeling much better.    Weakness     Story: secondary to Parkinson's. Impression: Went to the ER. Work up was negative in the ER. Discussed that since she declines medication for parkinson's that her disease will progress to issues with walking, talking, eating, all ADL's. She states understanding and her daughter does as well.       Family History   Problem Relation Age of Onset    Heart disease Other     Stroke Mother     Diabetes Mother        Social History     Socioeconomic History    Marital status:    Tobacco Use    Smoking status: Former     Passive exposure: Past    Smokeless tobacco: Never   Vaping Use    Vaping status: Never Used   Substance and Sexual Activity    Alcohol use: No    Drug use: Never    Sexual activity: Defer           ECG 12 Lead    Date/Time: 10/2/2024 2:12 PM  Performed by: Nancy Howell MD    Authorized by: Nancy Howell MD  Comparison: compared with previous ECG   Similar to previous ECG  Comparison to previous ECG: Atrial sensed ventricular paced rhythm 93/min left anterior fascicular block no ectopy no significant change from previous EKG.        Objective:       Physical Exam    There were no vitals taken for this visit.  The patient is alert, oriented and in no distress.    Vital signs as noted above.  Pacemaker site looks normal.    Head and neck revealed no carotid bruits or jugular venous distension.  No  thyromegaly or lymphadenopathy is present.    Lungs clear.  No wheezing.  Breath sounds are normal bilaterally.    Heart normal first and second heart sounds.  No murmur..  No pericardial rub is present.  No gallop is present.    Abdomen soft and nontender.  No organomegaly is present.    Extremities revealed good peripheral pulses without any pedal edema.    Skin warm and dry.    Musculoskeletal system is grossly normal.    CNS grossly normal.    Reviewed and updated.

## 2024-10-02 ENCOUNTER — CLINICAL SUPPORT NO REQUIREMENTS (OUTPATIENT)
Dept: CARDIOLOGY | Facility: CLINIC | Age: 89
End: 2024-10-02
Payer: MEDICARE

## 2024-10-02 ENCOUNTER — OFFICE VISIT (OUTPATIENT)
Dept: CARDIOLOGY | Facility: CLINIC | Age: 89
End: 2024-10-02
Payer: MEDICARE

## 2024-10-02 VITALS
SYSTOLIC BLOOD PRESSURE: 110 MMHG | BODY MASS INDEX: 19.97 KG/M2 | HEART RATE: 90 BPM | DIASTOLIC BLOOD PRESSURE: 70 MMHG | HEIGHT: 64 IN | WEIGHT: 117 LBS | OXYGEN SATURATION: 97 %

## 2024-10-02 DIAGNOSIS — Z95.0 PACEMAKER: Primary | ICD-10-CM

## 2024-10-02 DIAGNOSIS — I48.0 PAROXYSMAL ATRIAL FIBRILLATION: ICD-10-CM

## 2024-10-02 DIAGNOSIS — I44.2 AV BLOCK, COMPLETE: ICD-10-CM

## 2024-10-02 DIAGNOSIS — Z95.0 PACEMAKER: ICD-10-CM

## 2024-10-02 DIAGNOSIS — E78.2 MIXED HYPERLIPIDEMIA: ICD-10-CM

## 2024-10-02 DIAGNOSIS — I44.2 AV BLOCK, COMPLETE: Primary | ICD-10-CM

## 2024-10-02 DIAGNOSIS — I10 BENIGN ESSENTIAL HTN: ICD-10-CM

## 2024-10-02 RX ORDER — ERGOCALCIFEROL 1.25 MG/1
CAPSULE, LIQUID FILLED ORAL
COMMUNITY
Start: 2024-08-06

## 2024-10-02 RX ORDER — GLIPIZIDE 5 MG/1
TABLET ORAL
COMMUNITY
Start: 2024-09-03

## 2024-10-02 RX ORDER — LANOLIN ALCOHOL/MO/W.PET/CERES
CREAM (GRAM) TOPICAL
COMMUNITY
Start: 2024-09-14

## 2025-01-22 ENCOUNTER — TELEPHONE (OUTPATIENT)
Dept: CARDIOLOGY | Facility: CLINIC | Age: OVER 89
End: 2025-01-22
Payer: MEDICARE

## 2025-01-22 NOTE — TELEPHONE ENCOUNTER
Patients daughter is wanting an update on patient as far as cardiology stand point.    Please advise    Office Visit with Nancy Howell MD (10/02/2024)

## 2025-01-22 NOTE — TELEPHONE ENCOUNTER
Caller: KATHLEEN MORALES    Relationship: Emergency Contact    Best call back number: [phone]      What was the call regarding: PATIENT'S DAUGHTER CALLED TO FIND OUT ABOUT LAST OFFICE VISIT NOTES AND WANTED TO SPEAK WITH SOMEONE IN THE PRACTICE ABOUT HER MOTHER. STATED SHE WAS THE POA AND THAT THE PATIENT WAS IN A LIVING FACILITY AND THAT SHE HASN'T HAD AN UPDATE FROM THEM IN A WHILE ABOUT HER MOTHER CARDIOLOGY VISITS. PLEASE ADVISE.

## 2025-01-22 NOTE — TELEPHONE ENCOUNTER
Last visit 10/2/2024.  Pacemaker was functioning normally.  Battery status is 2 years prior to replacing the pacemaker battery.  Patient is on anticoagulation and not having any issues with it.  Heart rhythm seems to be maintaining in good rhythm  Patient is stable from a heart standpoint.

## 2025-01-22 NOTE — TELEPHONE ENCOUNTER
Patients daughter notified and understands.     Patients daughter was transferred to scheduling to make a 6 month follow up for patient.

## 2025-04-30 NOTE — PROGRESS NOTES
Encounter Date:05/13/2025  Last seen 10/2/2024.      Patient ID: Ashley Elkins is a 89 y.o. female.      Chief Complaint:     Status post pacemaker  History of atrial fibrillation  Dyslipidemia  Diabetes     History of Present Illness     Since I have last seen, the patient has been without any chest discomfort ,shortness of breath, palpitations, dizziness or syncope.  Denies having any headache ,abdominal pain ,nausea, vomiting , diarrhea constipation, loss of weight or loss of appetite.  Denies having any excessive bruising ,hematuria or blood in the stool.    Review of all systems negative except as indicated.    Reviewed ROS.    Assessment and Plan         ////////////////////////////  History  ==================  -status post permanent dual-chamber pacemaker implantation (PolyPidtronic MRI compatible) and removal of loop recorder 10/14/2016     - history of dizziness and near-syncope.  improved since pacemaker implantation      - history of atrial fibrillation -maintaining sinus rhythm.  Paroxysmal atrial fibrillation     -Anticoagulation-on Eliquis     -chest pain-improved  Negative Lexiscan Cardiolite test July 2016.       - diabetes dyslipidemia parkinsonism and non-Hodgkin's lymphoma     - history of normal coronary arteries and hypercontractile left ventricle 2012.     - status post cholecystectomy appendectomy and hysterectomy     - allergy to penicillin and sulfa      - DNR  =====  Plan  =======  Status post pacemaker  Pacemaker site looks normal.  Interrogation of the pacemaker revealed excellent pacing parameters.-5/13/2025.  Intermittent atrial fibrillation  Battery status 1.5 years.     Paroxysmal atrial fibrillation.  (Seen on interrogation of the pacemaker)     Anticoagulation status reviewed.  Continue Eliquis 2.5 mg twice daily.  Observe for toxic effects.     Hypothyroidism-on levothyroxine    Diabetes-no medications    Dyslipidemia-continue atorvastatin    Follow-up in the office in 6 months with  pacemaker interrogation.    Further plan will depend on patient's progress.    Reviewed and updated 5/13/2025.    .  /////////I       Diagnosis Plan   1. Pacemaker        2. AV block, complete        3. Paroxysmal atrial fibrillation        4. Benign essential HTN        5. Mixed hyperlipidemia        LAB RESULTS (LAST 7 DAYS)    CBC        BMP        CMP         BNP        TROPONIN        CoAg        Creatinine Clearance  CrCl cannot be calculated (Patient's most recent lab result is older than the maximum 30 days allowed.).    ABG        Radiology  No radiology results for the last day                The following portions of the patient's history were reviewed and updated as appropriate: allergies, current medications, past family history, past medical history, past social history, past surgical history, and problem list.    Review of Systems   Constitutional: Negative for malaise/fatigue.   Cardiovascular:  Negative for chest pain, leg swelling, palpitations and syncope.   Respiratory:  Negative for shortness of breath.    Skin:  Negative for rash.   Gastrointestinal:  Negative for nausea and vomiting.   Neurological:  Negative for dizziness, light-headedness and numbness.   All other systems reviewed and are negative.        Current Outpatient Medications:     acetaminophen (TYLENOL) 650 MG 8 hr tablet, Take  by mouth Every 8 (Eight) Hours., Disp: , Rfl:     apixaban (ELIQUIS) 2.5 MG tablet tablet, Take 1 tablet by mouth 2 (Two) Times a Day., Disp: , Rfl:     carbidopa-levodopa (SINEMET)  MG per tablet, , Disp: , Rfl:     coenzyme Q10 100 MG capsule, Take 4 capsules by mouth Daily., Disp: , Rfl:     glipizide (GLUCOTROL) 5 MG tablet, Take 1 tablet by mouth Every Morning., Disp: , Rfl:     Januvia 50 MG tablet, , Disp: , Rfl:     latanoprost (XALATAN) 0.005 % ophthalmic solution, , Disp: , Rfl:     levothyroxine (SYNTHROID, LEVOTHROID) 25 MCG tablet, Take 1 tablet by mouth Daily., Disp: , Rfl:      metFORMIN (GLUCOPHAGE) 1000 MG tablet, Take 1 tablet by mouth Daily With Breakfast., Disp: , Rfl:     mirtazapine (REMERON) 7.5 MG tablet, , Disp: , Rfl:     Multiple Vitamins-Minerals (OCUVITE PO), Take  by mouth., Disp: , Rfl:     ondansetron (ZOFRAN) 4 MG tablet, Take 1 tablet by mouth Every 8 (Eight) Hours As Needed for Nausea or Vomiting., Disp: , Rfl:     pantoprazole (PROTONIX) 20 MG EC tablet, , Disp: , Rfl:     polyethylene glycol (MIRALAX) 17 GM/SCOOP powder, , Disp: , Rfl:     timolol (TIMOPTIC) 0.5 % ophthalmic solution, , Disp: , Rfl:     vitamin B-12 (CYANOCOBALAMIN) 1000 MCG tablet, , Disp: , Rfl:     vitamin D (ERGOCALCIFEROL) 1.25 MG (06564 UT) capsule capsule, , Disp: , Rfl:     ALLERGY RELIEF 10 MG tablet, , Disp: , Rfl:     atorvastatin (LIPITOR) 10 MG tablet, Take 1 tablet by mouth Daily. (Patient not taking: Reported on 10/2/2024), Disp: , Rfl:     BIOTIN PO, BIOTIN (Patient not taking: Reported on 5/13/2025), Disp: , Rfl:     bisacodyl (DULCOLAX) 10 MG suppository, , Disp: , Rfl:     docusate sodium (COLACE) 100 MG capsule, Take 1 capsule by mouth. (Patient not taking: Reported on 5/13/2025), Disp: , Rfl:     gabapentin (NEURONTIN) 600 MG tablet, Take 1 tablet by mouth Daily. (Patient not taking: Reported on 5/13/2025), Disp: , Rfl:     guaiFENesin (MUCINEX) 600 MG 12 hr tablet, Take 1 tablet by mouth 2 (Two) Times a Day. (Patient not taking: Reported on 10/2/2024), Disp: , Rfl:     melatonin 5 MG tablet tablet, , Disp: , Rfl:     MILK OF MAGNESIA 7.75 % suspension, , Disp: , Rfl:     Probiotic Product (PROBIOTIC DAILY PO), Daily. (Patient not taking: Reported on 5/13/2025), Disp: , Rfl:     promethazine (PHENERGAN) 25 MG tablet, Daily. (Patient not taking: Reported on 5/13/2025), Disp: , Rfl:     sertraline (ZOLOFT) 100 MG tablet, , Disp: , Rfl:     traMADol (ULTRAM) 50 MG tablet, Take 1 tablet by mouth Every Night. (Patient not taking: Reported on 5/13/2025), Disp: 60 tablet, Rfl:  2    Allergies   Allergen Reactions    Penicillins Unknown (See Comments)    Sulfa Antibiotics Unknown (See Comments)       Family History   Problem Relation Age of Onset    Heart disease Other     Stroke Mother     Diabetes Mother        Past Surgical History:   Procedure Laterality Date    CARDIAC PACEMAKER PLACEMENT  10/14/2016    TOTAL ABDOMINAL HYSTERECTOMY  1978    (Ovaries remain)       Past Medical History:   Diagnosis Date    Abdominal discomfort, generalized     Impression: May be the metformin. Have her stop this. Will check labs    Abdominal distension     Impression: This may be due to a GI infection. She had a KUB ordered and read by me. The KUB was normal.    Abnormal gait     Impression: Deconditioning, needs physical therapy    Acute cystitis with hematuria     Impression: resolved    Acute non-recurrent maxillary sinusitis     Impression: She was prescribed Cipro to treat her symptoms. She was given Depo/Dexa IM inj. Increase fluids. Tylenol/motrin for pain or fever. Medication and medication adverse effects discussed. Follow-up 5-7 days for reevaluation if not improved or sooner if needed. The insulin will make her bs increase and she will need to monitor her sugar.    Anxiety     Impression: stable. No medications at this time.    At high risk for falls     Impression: due to Parkinson's related weakness.    Bacterial cystitis     Impression: ESBL with only 50,000 colonies and mild sx, not tested for Fosfomycin by due to the mild infection I feel it is better than more high risk drugs    Benign essential HTN     Impression: Improved from her previous visit. stable on recheck. Her blood pressure can not support and ace or arb    Benign essential tremor     Impression: stable, only worsens with eating and drinking.    Benign positional vertigo, bilateral     Impression: Based on her symptoms she likely has BPV. She was given a handout on the Epley manuever. She was encouraged to RTC if her symptoms  did not improve.    Bilateral acute serous otitis media     Impression: Improving. Advised patient to continue with claritin and add Flonase. If not improving, return to be re-evaluated.    Bloody stool     Impression: CBC was ordered. Hemoccult was ordered. Monitor her symptoms.    Boil of trunk     Impression: no fluctuance, discussed home care, return in a few days if it worsens    Breast pain in female     Impression: will get diagnostic mammograms    Bronchitis, acute     Impression: Increase fluids. Tylenol/motrin for pain or fever. Medication and medication adverse effects discussed. Follow-up 5-7 days for reevaluation if not improved or sooner if needed.    Cholelithiasis     Impression: s/p gall bladder    Colon cancer screening     Constipation, chronic     Impression: start Linzess 72mcg daily. Continue Colace. Hold Miralx and Milk of mag for diarrhea.    Costochondritis     Impression: ice, nsaids, Diagnosis, treatment and course discussed. Discussed medication, dosing and adverse effects. Return if there is worsening or persistance of symptoms Cause of chest pain    Cough     COVID-19     Cramp of both lower extremities     Impression: labs ordered    Depression     Impression: Stable on no medications. Monitor symptoms.    Diabetes mellitus type 2 in obese     Impression: Worsening She was advised to only take Metformin 1 tab BID. Glyburide was increased to BID. Continue to monitor sugars Goals developed at last visit were met Follow up in 4 months Care management needs are self addressed. 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 Consider a stool sample if does not improve.    Diffuse lymphoblastic non-Hodgkin's lymphoma     Impression: Stable. She has seen oncology    Dizziness     Impression: resolved, much  better    DNR (do not resuscitate)     Recorded 04/24/2019 01:09 AM by Kenton Owens MD, [facility].    Dysuria     Impression: send urine for culture prescription as below. She is going to see urology    Edema of left lower extremity     Impression: us ordered- returned negative for DVT    Encounter for screening for malignant neoplasm of other genitourinary organs     Impression: She declines    Encounter for screening mammogram for malignant neoplasm of breast     Eustachian tube dysfunction     External hemorrhoid     Impression: from diarrhea, a little sore she says.    Fatigue     Impression: check labs as above. possible UTI. Will get culture    Gastroesophageal reflux disease     Impression: She was seen at [facility] and stayed for cardiac work up, found to be gerd.    Generalized osteoarthritis     Impression: left knee    Glaucoma     Impression: worsening per patient and she is seeing opthomologist    Hearing difficulty     Hematuria     Impression: urine culture sent, anitbiotc sent    History of pulmonary embolism     History of tobacco use     Hypertriglyceridemia, essential     Hypothyroidism, unspecified     Impression: Stable. Refills were given.    Iatrogenic pulmonary embolism and infarction     Impression: mulitple PE in both lungs, 6/2011. Currenlty on no Coumodin.    Incisional pain     Impression: rash actually, irritated only    Iron deficiency anemia     Impression: per low MCV on cbc. Check iron levels to see how bad it is. Start iron supplement.    Kidney stone     Impression: found in ER, she has appt with urologist, Dr Everett    Knee pain     Impression: right knee strain, monitor. nsaids and tylenol. recheck 2 weeks if not better    Long term (current) use of oral hypoglycemic drugs     Macular disorder      (Probable Diagnosis) Story: Seeing retinal specialist at [facility] 10/16/14    Medicare annual wellness visit, subsequent     Story: She is on hospice care for parkinson's.   Impression: Discussed injury prevention, diet and exercise, safe sexual practices, and screening for common diseases. Encouraged use of sunscreen and seatbelts. Avoidance of tobacco encouraged. Limitation or avoidance of alcohol encouraged. Recommend yearly dental and eye exams. Also discussed monitoring of blood pressure, lipids.    Mixed hyperlipidemia     Impression: Discussed the need for her to remain on her statin. It was mutually decided that her Statin would be stopped on 07/06/17.    Nausea     Impression: She was prescribed Zofran to treat her symptoms.    Neuropathy of both feet     Impression: Secondary to diabetes and also parkinson's. Monofilament exam failed. Diabetic shoes recommended for this patient. Order faxed.    Other chest pain     Impression: Work up negative in hospital. It was chest wall pain. She has a prescription for norco that Dr Moncada gave her.    Overweight     Parkinson disease     Impression: Slowly worsening. Discussed disease progression. Discussed she will continue to get worse. Patient will need to be scheduled for a face to face. This will be done within the next 7 days.    Physical deconditioning     Impression: from parkinsons and age. WIll set up home health    Radiologic findings of lung field, abnormal     Restless leg     Impression: Stable.    Screening for hypercholesterolemia     Screening for osteoporosis     Senile osteoporosis     Impression: Diagnosis, treatment and course discussed. Discussed medication, dosing and adverse effects. Return if there is worsening or persistance of symptoms    Shortness of breath     Impression: continue oxygen therapy.    Skin tag     Sternoclavicular joint pain, left     Impression: New diagnosis-Advised patient to do ROM exercsies, alternate heat/ice on neck and gentle massage. discussed more aggressive workup and treatment but pt declined    Syncope     unspecified syncope type. Impression: possibly heart. She is seeing Dr Moncada     Tachycardia     Impression: check holter, send to cariologist- Dr Howell    Tremor     Impression: familial vs parkinson's. Neurologist is leaning to familial type. He is adjusting her meds. She is seeing Dr Butts    Type II diabetes mellitus, uncontrolled     Impression: 140    Upper back pain     Impression: muscular, discussed ice and otc tylenol. return if owrsens or not better in 2 weeks or sooner if needed    URI (upper respiratory infection)     Impression: Increase fluids. Tylenol/motrin for pain or fever. Medication and medication adverse effects discussed. Follow-up 5-7 days for reevaluation if not improved or sooner if needed.    Urinary frequency     Impression: could not urinate. Increase in urination at night, secodnary to new meds, Diagnosis, treatment and course discussed. Discussed medication, dosing and adverse effects. Return if there is worsening or persistance of symptoms Return for u/a next week. Let us know if change in meds, changes symptoms.    Urinary retention     Impression: mild. see if it resolves with resolution of constipation    UTI due to extended-spectrum beta lactamase (ESBL) producing Escherichia coli     Impression: pt has minimal sx with no evidence of pyloneph and only 50,000 colonies thus will treat with fosfomycin    Viral gastroenteritis     Impression: feeling much better.    Weakness     Story: secondary to Parkinson's. Impression: Went to the ER. Work up was negative in the ER. Discussed that since she declines medication for parkinson's that her disease will progress to issues with walking, talking, eating, all ADL's. She states understanding and her daughter does as well.       Family History   Problem Relation Age of Onset    Heart disease Other     Stroke Mother     Diabetes Mother        Social History     Socioeconomic History    Marital status:    Tobacco Use    Smoking status: Former     Passive exposure: Past    Smokeless tobacco: Never   Vaping Use  "   Vaping status: Never Used   Substance and Sexual Activity    Alcohol use: No    Drug use: Never    Sexual activity: Defer         Procedures      Objective:       Physical Exam    /75 (BP Location: Right arm, Patient Position: Sitting, Cuff Size: Adult)   Pulse 87   Ht 162.6 cm (64\")   Wt 48.1 kg (106 lb)   SpO2 96%   BMI 18.19 kg/m²   The patient is alert, oriented and in no distress.    Vital signs as noted above.    Head and neck revealed no carotid bruits or jugular venous distension.  No thyromegaly or lymphadenopathy is present.    Lungs clear.  No wheezing.  Breath sounds are normal bilaterally.    Heart normal first and second heart sounds.  No murmur..  No pericardial rub is present.  No gallop is present.    Abdomen soft and nontender.  No organomegaly is present.    Extremities revealed good peripheral pulses without any pedal edema.    Skin warm and dry.  Pacemaker site looks normal.    Musculoskeletal system is grossly normal.    CNS grossly normal.    Reviewed and updated.          "

## 2025-05-13 ENCOUNTER — OFFICE VISIT (OUTPATIENT)
Dept: CARDIOLOGY | Facility: CLINIC | Age: OVER 89
End: 2025-05-13
Payer: MEDICARE

## 2025-05-13 ENCOUNTER — CLINICAL SUPPORT NO REQUIREMENTS (OUTPATIENT)
Dept: CARDIOLOGY | Facility: CLINIC | Age: OVER 89
End: 2025-05-13
Payer: MEDICARE

## 2025-05-13 VITALS
SYSTOLIC BLOOD PRESSURE: 120 MMHG | WEIGHT: 106 LBS | DIASTOLIC BLOOD PRESSURE: 75 MMHG | HEIGHT: 64 IN | OXYGEN SATURATION: 96 % | HEART RATE: 87 BPM | BODY MASS INDEX: 18.1 KG/M2

## 2025-05-13 DIAGNOSIS — I48.0 PAROXYSMAL ATRIAL FIBRILLATION: ICD-10-CM

## 2025-05-13 DIAGNOSIS — I44.2 AV BLOCK, COMPLETE: ICD-10-CM

## 2025-05-13 DIAGNOSIS — Z95.0 PACEMAKER: Primary | ICD-10-CM

## 2025-05-13 DIAGNOSIS — I10 BENIGN ESSENTIAL HTN: ICD-10-CM

## 2025-05-13 DIAGNOSIS — E78.2 MIXED HYPERLIPIDEMIA: ICD-10-CM

## 2025-05-13 DIAGNOSIS — I44.2 AV BLOCK, COMPLETE: Primary | ICD-10-CM

## 2025-05-13 DIAGNOSIS — Z95.0 PACEMAKER: ICD-10-CM

## 2025-05-13 RX ORDER — UBIDECARENONE 100 MG
400 CAPSULE ORAL DAILY
COMMUNITY

## 2025-05-13 RX ORDER — ONDANSETRON 4 MG/1
4 TABLET, FILM COATED ORAL EVERY 8 HOURS PRN
COMMUNITY